# Patient Record
Sex: FEMALE | Race: OTHER | URBAN - METROPOLITAN AREA
[De-identification: names, ages, dates, MRNs, and addresses within clinical notes are randomized per-mention and may not be internally consistent; named-entity substitution may affect disease eponyms.]

---

## 2018-07-17 ENCOUNTER — OFFICE VISIT (OUTPATIENT)
Dept: FAMILY MEDICINE CLINIC | Age: 68
End: 2018-07-17

## 2018-07-17 ENCOUNTER — HOSPITAL ENCOUNTER (OUTPATIENT)
Dept: LAB | Age: 68
Discharge: HOME | End: 2018-07-17

## 2018-07-17 VITALS
HEIGHT: 57 IN | SYSTOLIC BLOOD PRESSURE: 168 MMHG | DIASTOLIC BLOOD PRESSURE: 84 MMHG | BODY MASS INDEX: 32.36 KG/M2 | TEMPERATURE: 98.3 F | HEART RATE: 68 BPM | WEIGHT: 150 LBS

## 2018-07-17 DIAGNOSIS — E03.9 ACQUIRED HYPOTHYROIDISM: ICD-10-CM

## 2018-07-17 DIAGNOSIS — K76.89 LIVER CYST: ICD-10-CM

## 2018-07-17 DIAGNOSIS — K59.04 CHRONIC IDIOPATHIC CONSTIPATION: ICD-10-CM

## 2018-07-17 DIAGNOSIS — R03.0 ELEVATED BLOOD PRESSURE READING WITHOUT DIAGNOSIS OF HYPERTENSION: ICD-10-CM

## 2018-07-17 DIAGNOSIS — E03.9 ACQUIRED HYPOTHYROIDISM: Primary | ICD-10-CM

## 2018-07-17 LAB
ALBUMIN SERPL-MCNC: 3.9 G/DL (ref 3.5–5)
ALBUMIN/GLOB SERPL: 1.1 {RATIO} (ref 1.1–2.2)
ALP SERPL-CCNC: 117 U/L (ref 45–117)
ALT SERPL-CCNC: 26 U/L (ref 12–78)
ANION GAP SERPL CALC-SCNC: 7 MMOL/L (ref 5–15)
AST SERPL-CCNC: 26 U/L (ref 15–37)
BILIRUB SERPL-MCNC: 0.3 MG/DL (ref 0.2–1)
BUN SERPL-MCNC: 11 MG/DL (ref 6–20)
BUN/CREAT SERPL: 14 (ref 12–20)
CALCIUM SERPL-MCNC: 9.3 MG/DL (ref 8.5–10.1)
CHLORIDE SERPL-SCNC: 104 MMOL/L (ref 97–108)
CO2 SERPL-SCNC: 30 MMOL/L (ref 21–32)
CREAT SERPL-MCNC: 0.77 MG/DL (ref 0.55–1.02)
EST. AVERAGE GLUCOSE BLD GHB EST-MCNC: 117 MG/DL
GLOBULIN SER CALC-MCNC: 3.6 G/DL (ref 2–4)
GLUCOSE SERPL-MCNC: 108 MG/DL (ref 65–100)
HBA1C MFR BLD: 5.7 % (ref 4.2–6.3)
POTASSIUM SERPL-SCNC: 4.3 MMOL/L (ref 3.5–5.1)
PROT SERPL-MCNC: 7.5 G/DL (ref 6.4–8.2)
SODIUM SERPL-SCNC: 141 MMOL/L (ref 136–145)
TSH SERPL DL<=0.05 MIU/L-ACNC: 1.33 UIU/ML (ref 0.36–3.74)

## 2018-07-17 PROCEDURE — 83036 HEMOGLOBIN GLYCOSYLATED A1C: CPT | Performed by: FAMILY MEDICINE

## 2018-07-17 PROCEDURE — 80053 COMPREHEN METABOLIC PANEL: CPT | Performed by: FAMILY MEDICINE

## 2018-07-17 PROCEDURE — 84443 ASSAY THYROID STIM HORMONE: CPT | Performed by: FAMILY MEDICINE

## 2018-07-17 RX ORDER — DOCUSATE SODIUM 100 MG/1
100 CAPSULE, LIQUID FILLED ORAL
Qty: 60 CAP | Refills: 2 | Status: SHIPPED | OUTPATIENT
Start: 2018-07-17

## 2018-07-17 NOTE — PATIENT INSTRUCTIONS
Estreñimiento: Instrucciones de cuidado - [ Constipation: Care Instructions ] Instrucciones de cuidado Tener estreñimiento significa que usted tiene dificultades para eliminar las heces (evacuaciones del intestino). Las personas eliminan heces entre 3 veces al día y [place] vez cada 3 días. Lo que es normal para usted puede ser [employer]. El estreñimiento puede ocurrir con dolor en el recto y cólicos. El dolor podría empeorar cuando trata de eliminar las heces. A veces hay pequeñas cantidades de paul gabino viva en el papel higiénico o en la superficie de las heces. [place] se debe a las venas dilatadas cerca del recto (hemorroides). Algunos cambios en alcaraz [place] y estilo de lencho podrían ayudarle a evitar el estreñimiento continuo. Es posible que el médico además le recete medicamentos para ayudar a aflojar las heces. Algunos medicamentos pueden causar estreñimiento. [place] incluye los analgésicos (medicamentos para el dolor) y los antidepresivos. Infórmele a alcaraz médico sobre Justine Larry que usted tomeka. Es posible que alcaraz médico quiera cambiar un medicamento para aliviar natty síntomas. La atención de seguimiento es cary parte clave de alcaraz tratamiento y seguridad. Asegúrese de hacer y acudir a todas las citas, y llame a alcaraz médico si está teniendo problemas. También es cary buena idea saber los resultados de natty exámenes y mantener cary lista de los medicamentos que tomeka. Cómo puede cuidarse en el hogar? · [place] abundantes líquidos, los suficientes suraj para que alcaraz orina sea de color amarillo sam o transparente suraj el agua. Si tiene [employer], del corazón o del hígado y tiene que Nelson's líquidos, hable con alcaraz médico antes de aumentar alcaraz consumo. · Incluya en alcaraz dieta diaria alimentos ricos en fibra. Estos incluyen frutas, verduras, frijoles (habichuelas) y granos integrales. · Melba por lo menos 30 minutos de ejercicio la mayoría de los días de la [place].  Caminar es [place] opción. Es posible que también quiera hacer otras actividades, suraj correr, nadar, [employer], o jugar al tenis u otros deportes de equipo. · [place] un suplemento de [place], suraj Cit[employer]cel o Metamucil, todos los GRASSE. Marie y siga todas las indicaciones de la [place]. · Programe tiempo todos los días para evacuar el intestino. Anurag bergeron podría ayudar. Tómese alcaraz tiempo para evacuar el intestino. · Apoye los pies sobre un banco o taburete pequeño cuando se siente en el inodoro. [place] ayuda a flexionar las caderas y coloca la pelvis en posición de cuclillas. · Alcaraz médico podría recomendarle un laxante de venta deborah para aliviar el estreñimiento. Nonda Rude son Ashleigh Bottcher de Magnesia (Milk of Magnesia) y [place]. Marie y siga todas las instrucciones de la [place]. No use laxantes de [employer]. Cuándo debe pedir ayuda? Llame a alcaraz médico ahora mismo o busque atención médica inmediata si: 
  · Tiene dolor abdominal nuevo o peor.  
  · Tiene náuseas o vómito nuevos o peores.  
  · Tiene paul en las heces.  
 Preste especial atención a los cambios en alcaraz thad y asegúrese de comunicarse con alcaraz médico si: 
  · Alcaraz estreñimiento empeora.  
  · No mejora suraj se esperaba. Dónde puede encontrar más información en inglés? Sandie Thelma a http://awilda-bj.info/. Escriba P343 en la búsqueda para aprender Bernard Lindo de \"Estreñimiento: Instrucciones de cuidado - [ Constipation: Care Instructions ]. \" 
Revisado: 20 noviembre, 2017 Versión del contenido: 11.7 © 9461-1865 Healthwise, Incorporated. Las instrucciones de cuidado fueron adaptadas bajo licencia por [facility] [employer]ctions (which disclaims liability or warranty for this information). Si usted tiene [place] afección médica o sobre estas instrucciones, siempre pregunte a alcaraz profesional de thad. Healthwise, Incorporated niega toda garantía o responsabilidad por alcaraz uso de esta información.  
 
  
Presión arterial elevada: Instrucciones de cuidado - [ Elevated Blood Pressure: Care Instructions ] Instrucciones de cuidado La presión arterial es cary medida de la fuerza que ejerce la paul contra las heart de las arterias. Es normal que la presión arterial suba y baje a lo enedelia del día. Deya si se mantiene lizbeth por un tiempo, usted tiene presión arterial lizbeth. Dos números indican alcaraz presión arterial. El primer número es la presión sistólica. Muestra qué tan alexandre presiona la paul cuando el corazón está bombeando. El milly número es la presión diastólica. Muestra qué tan alexandre presiona la [employer] latidos, cuando el corazón está relajado y llenándose de [place]. Lourena Alias arterial ideal para adultos es menos de 120/80 (diga \"120 sobre 80\"). La presión arterial lizbeth es de 140/90 o superior. Usted tiene la presión arterial lizbeth si el número de [place] es 140 o superior o el número de abajo es 90 o superior, o ambas cosas. La prueba principal para la presión arterial lizbeth es simple, rápida e indolora. Para diagnosticar presión arterial lizbeth, alcaraz médico examinará alcaraz presión arterial en momentos diferentes. Después de tomarle la presión, es posible que alcaraz médico le pida que se vuelva a cecilia la presión en casa. Si se le diagnostica presión arterial lizbeth, puede colaborar con alcaraz médico para elaborar un plan a enedelia plazo para manejarla. La atención de seguimiento es cary parte clave de alcaraz tratamiento y seguridad. Asegúrese de hacer y acudir a todas las citas, y llame a alcaraz médico si está teniendo problemas. También es cary buena idea saber los resultados de los exámenes y mantener cary lista de los medicamentos que tomeka. Cómo puede cuidarse en el hogar? · No fume. Fumar aumenta alcaraz riesgo de ataque cerebral y ataque al corazón. Si necesita ayuda para dejarlo, hable con alcaraz médico sobre programas y medicamentos para dejar de fumar. Estos pueden aumentar natty probabilidades de dejar de fumar para siempre.  
· Shashank Jacobo un peso saludable. · Trate de limitar la cantidad de sodio que ingiere a menos de 2,300 miligramos (mg) al día. Alcaraz médico podría pedirle que trate de ingerir menos de 1,500 mg al día. · Manténgase físicamente activo. Melba al menos 30 minutos de ejercicio la mayoría de los días de la [place]. Caminar es cary buena opción. Es posible que también quiera hacer otras actividades, suraj correr, nadar, [employer], o practicar tenis o deportes de equipo. · No tome alcohol o limite la cantidad que edouard. Hable con alcaraz médico acerca de si puede cecilia alcohol. · Coma abundantes frutas, verduras y productos lácteos bajos en grasa. Consuma menos grasa saturada y total. 
· Aprenda a revisarse la presión arterial en alcaraz hogar. Cuándo debe pedir ayuda? Llame a alcaraz médico ahora mismo o busque atención médica inmediata si: 
? · Alcaraz presión arterial es mucho más lizbeth de lo normal (suraj 180/110 o superior). ? · Alexa que la presión arterial lizbeth está causando síntomas suraj: ¨ Dolor de eyal intenso. Õpetajate 63. ? Vigile muy de cerca los cambios en alcaraz thad, y asegúrese de comunicarse con alcaraz médico si: 
? · No mejora suraj se esperaba. Dónde puede encontrar más información en inglés? Iggy Kilpatrick a http://awilda-bj.info/. Glorya Sacks P301 en la búsqueda para aprender más acerca de \"Presión arterial elevada: Instrucciones de cuidado - [ Elevated Blood Pressure: Care Instructions ]. \" 
Revisado: 21 septiembre, 2016 Versión del contenido: 11.4 © 6240-6929 Healthwise, Incorporated. Las instrucciones de cuidado fueron adaptadas bajo licencia por [facility] Connections (which disclaims liability or warranty for this information). Si usted tiene [place] afección médica o sobre estas instrucciones, siempre pregunte a alcaraz profesional de thad. [facility] niega toda garantía o responsabilidad por alcaraz uso de esta información.

## 2018-07-17 NOTE — PROGRESS NOTES
Noemi Carey is a 80 y.o. female    Issues discussed today include:    Chief Complaint   Patient presents with    Thyroid Problem    Medication Refill       1) Thyroid problem:  Dx'd with hypothyroidism in [place] ~ 2 yrs ago. Is taking levothyroxine 25mcg daily in the am. Had labs q6 months for this, will be due in August 2018. Started this 2 yrs ago and they doctor told her she would need it forever. At that time was having dry cough and lots, phlegm. Denies having fatigue, cold intolerance at that time. Then and now experiences constipation. 2) S/p cholecystectomy:  Had surgery 2 yrs ago. She recently had an US of her abd and they saw incidental \"simple cysts\" in the liver. Says she had the us bc during the gall bladder surgery, he saw the cysts and wanted to investigate more. Data reviewed or ordered today:       Other problems include: There is no problem list on file for this patient. Medications: Allergies:  No Known Allergies    LMP:  No LMP recorded. Patient is not currently having periods (Reason: Menopause). Social History     Social History    Marital status:      Spouse name: N/A    Number of children: N/A    Years of education: N/A     Occupational History    Not on file. Social History Main Topics    Smoking status: Never Smoker    Smokeless tobacco: Never Used    Alcohol use No    Drug use: No    Sexual activity: Not on file     Other Topics Concern    Not on file     Social History Narrative    No narrative on file       No family history on file.       Physical Exam   Visit Vitals    /84 (BP 1 Location: Left arm, BP Patient Position: Sitting)    Pulse 68    Temp 98.3 °F (36.8 °C) (Oral)    Ht 4' 8.69\" (1.44 m)    Wt 150 lb (68 kg)    BMI 32.81 kg/m2      BP Readings from Last 3 Encounters:   07/17/18 168/84     Constitutional: Appears well,  No acute distress, Vitals noted  Psychiatric:  Affect normal, Alert and Oriented to person/place/time  Eyes:  Conjunctiva clear, no drainage  ENT:  External ears and nose normal, Mucous membranes moist  Neck:  General inspection normal. Supple. Heart:  Normal HR, Normal S1 and S2,  Regular rhythm. No murmurs, rubs or gallops. Lungs:  Clear to auscultation, good respiratory effort, no wheezes, rales or rhonchi  Extremities: Without edema, good peripheral pulses  Skin:  Warm to palpation, without rashes      Assessment/Plan:      ICD-10-CM ICD-9-CM    1. Acquired hypothyroidism E03.9 244.9 TSH 3RD GENERATION   2. Liver cyst Q20.10 680.6 METABOLIC PANEL, COMPREHENSIVE   3. Chronic idiopathic constipation R90.73 400.07 METABOLIC PANEL, COMPREHENSIVE   4. Elevated blood pressure reading without diagnosis of hypertension R03.0 796.2    5. Body mass index (BMI) of 32.0 to 32.9 in adult Z68.32 V85.32 HEMOGLOBIN A1C WITH EAG       1) Hypothyroidism - chronic, followed in [place] and has meds  - TSH today  - F/u with MD in [place]    2) Chronic constipation - may be 2/2 #1 or idiopathic  - Rx for colace bid prn  - AVS handout on topic    3) Elevated BP - pt without known h/o HTN  - Check BP 1-2 times weekly and RTC if consistently > 150/90    4) Simple cysts in liver measuring 1.38 - 2.02 cm on US, no need for f/u imaging  - Will make copy of documents for pt's file    Labs today - will call if abnormal or she can call in a week to know results        Follow-up Disposition:  Return in about 3 months (around 10/17/2018).         Justice Danielson MD  [address] [place] [address]

## 2018-07-17 NOTE — PROGRESS NOTES
Patient seen for discharge with assistance from [employer]  # [id number]. We reviewed the AVS instructions and printed prescription for Colace. The patient stated she is leaving in August to return to her home in [place] for a year. She will follow-up with her doctor there. She will go now to the lab.  Erica Day RN

## 2018-07-18 NOTE — PROGRESS NOTES
TSH wnl  ** Continue current dose of levothyroxine pt is taking from [facility], she told me yesterday she had enough pills and didn't need rx    A1c 5.7% - barely in the pre-diabetic range  ** Work on diet and exercise, weight loss. Avoid concentrated sweets, no soda. Aim for 20 minutes of aerobic exercise daily or 150 minutes weekly.    ** Can get last q 6-12 months in [place] to be sure no progressing    BMP wnl, no kidney or electrolyte problems identified

## 2018-08-10 NOTE — PROGRESS NOTES
Telephoned patient to home/cell, no answer left generic message to return call to the [facility] main office. Tried calling emergency contact but the phone number is the same as the patient.  Summer Garcia RN

## 2018-08-14 ENCOUNTER — TELEPHONE (OUTPATIENT)
Dept: FAMILY MEDICINE CLINIC | Age: 68
End: 2018-08-14

## 2018-08-15 NOTE — TELEPHONE ENCOUNTER
ID # [id number]    Discussed lab results and Dr thompson recommendations with patient. Pt verbalized understanding and had no further questions.

## 2022-03-19 PROBLEM — K76.89 LIVER CYST: Status: ACTIVE | Noted: 2019-09-13

## 2022-03-19 PROBLEM — E03.9 ACQUIRED HYPOTHYROIDISM: Status: ACTIVE | Noted: 2019-09-13

## 2022-09-15 ENCOUNTER — OFFICE VISIT (OUTPATIENT)
Dept: FAMILY MEDICINE CLINIC | Age: 72
End: 2022-09-15

## 2022-09-15 VITALS
RESPIRATION RATE: 16 BRPM | SYSTOLIC BLOOD PRESSURE: 138 MMHG | OXYGEN SATURATION: 99 % | DIASTOLIC BLOOD PRESSURE: 80 MMHG | HEIGHT: 57 IN | TEMPERATURE: 97.9 F | BODY MASS INDEX: 35.03 KG/M2 | HEART RATE: 73 BPM | WEIGHT: 162.4 LBS

## 2022-09-15 DIAGNOSIS — E66.01 SEVERE OBESITY (BMI 35.0-39.9) WITH COMORBIDITY (HCC): ICD-10-CM

## 2022-09-15 DIAGNOSIS — R05.9 COUGH: Primary | ICD-10-CM

## 2022-09-15 DIAGNOSIS — E78.5 HYPERLIPIDEMIA, UNSPECIFIED HYPERLIPIDEMIA TYPE: ICD-10-CM

## 2022-09-15 DIAGNOSIS — E03.9 ACQUIRED HYPOTHYROIDISM: ICD-10-CM

## 2022-09-15 PROCEDURE — 99203 OFFICE O/P NEW LOW 30 MIN: CPT | Performed by: NURSE PRACTITIONER

## 2022-09-15 PROCEDURE — 1123F ACP DISCUSS/DSCN MKR DOCD: CPT | Performed by: NURSE PRACTITIONER

## 2022-09-15 RX ORDER — LORATADINE 10 MG/1
10 TABLET ORAL DAILY
Qty: 30 TABLET | Refills: 3 | Status: SHIPPED | OUTPATIENT
Start: 2022-09-15

## 2022-09-15 RX ORDER — LEVOTHYROXINE SODIUM 25 UG/1
25 TABLET ORAL
COMMUNITY

## 2022-09-15 RX ORDER — ROSUVASTATIN CALCIUM 20 MG/1
20 TABLET, COATED ORAL
COMMUNITY

## 2022-09-15 NOTE — PROGRESS NOTES
9/15/2022 : Mariana Lina Tamayo (: 1950) is a 71 y.o. female,  established patient, here for evaluation of the following chief complaint(s):  Thyroid Problem (Pt c/o \"swollen gland in neck that leads directly to heart\"; causes neck pain and itchiness) and Annual Wellness Visit (Hasn't been seen in <1 year)     ASSESSMENT/PLAN:  Below is the assessment and plan developed based on review of pertinent history, physical exam, labs, studies, and medications. 1. Cough  -     loratadine (CLARITIN) 10 mg tablet; Take 1 Tablet by mouth daily. For allergies; Margi 1 tab diario para alergias, Normal, Disp-30 Tablet, R-3  2. Acquired hypothyroidism  -     TSH 3RD GENERATION; Future  -     HEMOGLOBIN A1C WITH EAG; Future  -     METABOLIC PANEL, COMPREHENSIVE; Future  3. Hyperlipidemia, unspecified hyperlipidemia type  4. Severe obesity (BMI 35.0-39. 9) with comorbidity [facility])    She goes back to her country in [place]. SUBJECTIVE/OBJECTIVE:  HPI History of high cholesterol that \"is stuck inside of my gland due to the problems I have with my thyroid\". Her throat itches and has cough. In the day and night. Dry cough. Since 8 years ago. Here with Nadia Oz. Here since . Has gotten worse since being in [place]. No itching in the throat or eyes. No results found for any visits on 09/15/22. Review of Systems: Negative for: fever, chest pain, shortness of breath, leg swelling. Social History:  reports that she has never smoked. She has never used smokeless tobacco. She reports that she does not drink alcohol and does not use drugs. Current Medications:   Current Outpatient Medications   Medication Sig    valsartan 320 mg tab 320 mg, hydroCHLOROthiazide 12.5 mg cap 12.5 mg Take 1 Dose by mouth daily. levothyroxine (SYNTHROID) 25 mcg tablet Take 25 mcg by mouth Daily (before breakfast). rosuvastatin (CRESTOR) 20 mg tablet Take 20 mg by mouth nightly.     loratadine (CLARITIN) 10 mg tablet Take 1 Tablet by mouth daily. For allergies; Margi 1 tab diario para alergias    docusate sodium (COLACE) 100 mg capsule Take 1 Cap by mouth two (2) times daily as needed for Constipation. [place] 1 capsula dos veces al sindhu si necesita para estrinimiento (Patient not taking: Reported on 9/15/2022)   117/67 with pulse 81. Physical Examination: No LMP recorded. (Menstrual status: Menopause). Blood pressure 138/80, pulse 73, temperature 97.9 °F (36.6 °C), temperature source Temporal, resp. rate 16, height 4' 8.69\" (1.44 m), weight 162 lb 6.4 oz (73.7 kg), SpO2 99 %. General appearance - well developed, no acute distress. Chest - clear to auscultation. Heart - regular rate and rhythm without murmurs, rubs, or gallops. Abdomen - bowel sounds present x 4, NT, ND  Extremities - no CCE. An electronic signature was used to authenticate this note.   -- Jad Santana NP

## 2022-09-15 NOTE — PROGRESS NOTES
Chief Complaint   Patient presents with    Thyroid Problem     Pt c/o \"swollen gland in neck that leads directly to heart\"; causes neck pain and itchiness    Annual Wellness Visit     Hasn't been seen in <1 year     Visit Vitals  BP (!) 170/74 (BP 1 Location: Right upper arm, BP Patient Position: Sitting)   Pulse 73   Temp 97.9 °F (36.6 °C) (Temporal)   Resp 16   Ht 4' 8.69\" (1.44 m)   Wt 162 lb 6.4 oz (73.7 kg)   SpO2 99%   BMI 35.52 kg/m²     Coordination of Care  1. Have you been to the ER, urgent care clinic since your last visit? Hospitalized since your last visit? Yes [facility] ~ 2 months ago for Bronchitis    2. Have you seen or consulted any other health care providers outside of the [address] [place] [address] since your last visit? Include any pap smears or colon screening. No    Does the patient need refills? N/A    Learning Assessment Complete?  yes  Depression Screening complete in the past 12 months? yes

## 2022-09-15 NOTE — PROGRESS NOTES
AVS printed and with the assistance of Georgian interpretor, Haseeb Heath, reviewed with patient. Rodger coupon for Loratadine at [facility] and given to patient. Patient indicated understanding and appreciated the service today.

## 2022-09-16 ENCOUNTER — HOSPITAL ENCOUNTER (OUTPATIENT)
Dept: LAB | Age: 72
Discharge: HOME | End: 2022-09-16

## 2022-09-16 DIAGNOSIS — E03.9 ACQUIRED HYPOTHYROIDISM: ICD-10-CM

## 2022-09-16 LAB
ALBUMIN SERPL-MCNC: 3.7 G/DL (ref 3.5–5)
ALBUMIN/GLOB SERPL: 1.2 {RATIO} (ref 1.1–2.2)
ALP SERPL-CCNC: 84 U/L (ref 45–117)
ALT SERPL-CCNC: 25 U/L (ref 12–78)
ANION GAP SERPL CALC-SCNC: 6 MMOL/L (ref 5–15)
AST SERPL-CCNC: 19 U/L (ref 15–37)
BILIRUB SERPL-MCNC: 0.3 MG/DL (ref 0.2–1)
BUN SERPL-MCNC: 14 MG/DL (ref 6–20)
BUN/CREAT SERPL: 19 (ref 12–20)
CALCIUM SERPL-MCNC: 9.3 MG/DL (ref 8.5–10.1)
CHLORIDE SERPL-SCNC: 106 MMOL/L (ref 97–108)
CO2 SERPL-SCNC: 29 MMOL/L (ref 21–32)
CREAT SERPL-MCNC: 0.72 MG/DL (ref 0.55–1.02)
EST. AVERAGE GLUCOSE BLD GHB EST-MCNC: 126 MG/DL
GLOBULIN SER CALC-MCNC: 3.1 G/DL (ref 2–4)
GLUCOSE SERPL-MCNC: 93 MG/DL (ref 65–100)
HBA1C MFR BLD: 6 % (ref 4–5.6)
POTASSIUM SERPL-SCNC: 3.9 MMOL/L (ref 3.5–5.1)
PROT SERPL-MCNC: 6.8 G/DL (ref 6.4–8.2)
SODIUM SERPL-SCNC: 141 MMOL/L (ref 136–145)
TSH SERPL DL<=0.05 MIU/L-ACNC: 1.86 UIU/ML (ref 0.36–3.74)

## 2022-09-16 PROCEDURE — 83036 HEMOGLOBIN GLYCOSYLATED A1C: CPT

## 2022-09-16 PROCEDURE — 80053 COMPREHEN METABOLIC PANEL: CPT

## 2022-09-16 PROCEDURE — 84443 ASSAY THYROID STIM HORMONE: CPT

## 2022-09-21 NOTE — PROGRESS NOTES
A normal lab letter was created and sent to queue, to be printed and mailed to the pt when a printer is available.  Antonio Montaño RN

## 2023-05-22 RX ORDER — LEVOTHYROXINE SODIUM 0.03 MG/1
25 TABLET ORAL
COMMUNITY

## 2023-05-22 RX ORDER — ROSUVASTATIN CALCIUM 20 MG/1
20 TABLET, COATED ORAL NIGHTLY
COMMUNITY

## 2023-05-22 RX ORDER — PSEUDOEPHEDRINE HCL 30 MG
100 TABLET ORAL 2 TIMES DAILY PRN
COMMUNITY
Start: 2018-07-17

## 2023-05-22 RX ORDER — LORATADINE 10 MG/1
10 TABLET ORAL DAILY
COMMUNITY
Start: 2022-09-15

## 2024-02-16 ENCOUNTER — HOSPITAL ENCOUNTER (EMERGENCY)
Facility: HOSPITAL | Age: 74
Discharge: HOME | End: 2024-02-16
Attending: EMERGENCY MEDICINE

## 2024-02-16 VITALS
HEART RATE: 78 BPM | HEIGHT: 57 IN | OXYGEN SATURATION: 100 % | TEMPERATURE: 97.4 F | BODY MASS INDEX: 33.53 KG/M2 | RESPIRATION RATE: 16 BRPM | DIASTOLIC BLOOD PRESSURE: 63 MMHG | SYSTOLIC BLOOD PRESSURE: 127 MMHG | WEIGHT: 155.42 LBS

## 2024-02-16 DIAGNOSIS — M79.604 RIGHT LEG PAIN: Primary | ICD-10-CM

## 2024-02-16 PROCEDURE — 99282 EMERGENCY DEPT VISIT SF MDM: CPT

## 2024-02-16 NOTE — ED PROVIDER NOTES
[facility] EMERGENCY DEPT  EMERGENCY DEPARTMENT ENCOUNTER      Pt Name: Siobhan Rider  MRN: [id number]  Birthdate 1950  Date of evaluation: 2/16/2024  Provider: Kye Johnson MD      HISTORY OF PRESENT ILLNESS      73-year-old  female presenting to the ER for multiple complaints.  Patient reports she has a history of uterine cancer, MI.  Reports that [place] just arrived [place] the [place] yesterday from [place].  She is looking to establish primary care.  States that she is on multiple medications but does not know any of their names at this point.  Reports that she has some chronic abdominal discomfort and chronic right lower extremity discomfort.  Her pain is currently well-controlled.  Her goal is to establish primary care.              Nursing Notes were reviewed.    REVIEW OF SYSTEMS         Review of Systems   Constitutional:  Negative for chills and fever.   HENT:  Negative for congestion and sore throat.    Eyes:  Negative for pain and visual disturbance.   Respiratory:  Negative for chest tightness and shortness of breath.    Cardiovascular:  Negative for chest pain and leg swelling.   Gastrointestinal:  Positive for abdominal pain. Negative for vomiting.   Genitourinary:  Negative for dysuria.   Musculoskeletal:  Positive for myalgias. Negative for back pain.   Skin:  Negative for rash.   Neurological:  Negative for weakness and headaches.   All other systems reviewed and are negative.          PAST MEDICAL HISTORY   No past medical history on file.      SURGICAL HISTORY     No past surgical history on file.      CURRENT MEDICATIONS       Previous Medications    DOCUSATE (COLACE, DULCOLAX) 100 MG CAPS    Take 100 mg by mouth 2 times daily as needed    LEVOTHYROXINE (SYNTHROID) 25 MCG TABLET    Take 1 tablet by mouth every morning (before breakfast)    LORATADINE (CLARITIN) 10 MG TABLET    Take 1 tablet by mouth daily    ROSUVASTATIN (CRESTOR) 20 MG TABLET    Take 1 tablet by mouth nightly

## 2024-02-16 NOTE — ED TRIAGE NOTES
Patient arrives to the ED via POV with complaints of bilateral leg spasms that occurs at intermittently night.     Patient reports no pain at this time. Patient reports pain is relieved upon standing.    PMH of HTN

## 2024-03-19 ENCOUNTER — OFFICE VISIT (OUTPATIENT)
Age: 74
End: 2024-03-19

## 2024-03-19 VITALS
HEART RATE: 83 BPM | DIASTOLIC BLOOD PRESSURE: 64 MMHG | HEIGHT: 57 IN | WEIGHT: 151.5 LBS | SYSTOLIC BLOOD PRESSURE: 138 MMHG | BODY MASS INDEX: 32.68 KG/M2 | TEMPERATURE: 97.3 F | OXYGEN SATURATION: 98 %

## 2024-03-19 DIAGNOSIS — I10 HYPERTENSION, UNSPECIFIED TYPE: ICD-10-CM

## 2024-03-19 DIAGNOSIS — K29.60 OTHER GASTRITIS WITHOUT HEMORRHAGE, UNSPECIFIED CHRONICITY: ICD-10-CM

## 2024-03-19 DIAGNOSIS — E78.00 PURE HYPERCHOLESTEROLEMIA: ICD-10-CM

## 2024-03-19 DIAGNOSIS — E03.9 HYPOTHYROIDISM, UNSPECIFIED TYPE: ICD-10-CM

## 2024-03-19 DIAGNOSIS — I20.9 ANGINA PECTORIS (HCC): Primary | ICD-10-CM

## 2024-03-19 PROCEDURE — 99203 OFFICE O/P NEW LOW 30 MIN: CPT | Performed by: NURSE PRACTITIONER

## 2024-03-19 PROCEDURE — 3078F DIAST BP <80 MM HG: CPT | Performed by: NURSE PRACTITIONER

## 2024-03-19 PROCEDURE — 3075F SYST BP GE 130 - 139MM HG: CPT | Performed by: NURSE PRACTITIONER

## 2024-03-19 PROCEDURE — 1123F ACP DISCUSS/DSCN MKR DOCD: CPT | Performed by: NURSE PRACTITIONER

## 2024-03-19 RX ORDER — NITROGLYCERIN 0.4 MG/1
0.4 TABLET SUBLINGUAL EVERY 5 MIN PRN
Qty: 6 TABLET | Refills: 0 | Status: SHIPPED | OUTPATIENT
Start: 2024-03-19 | End: 2024-03-19

## 2024-03-19 RX ORDER — ASPIRIN 81 MG/1
81 TABLET ORAL DAILY
Qty: 90 TABLET | Refills: 0 | Status: SHIPPED | OUTPATIENT
Start: 2024-03-19

## 2024-03-19 RX ORDER — ESOMEPRAZOLE MAGNESIUM 40 MG/1
40 CAPSULE, DELAYED RELEASE ORAL
Qty: 30 CAPSULE | Refills: 1 | Status: SHIPPED | OUTPATIENT
Start: 2024-03-19

## 2024-03-19 RX ORDER — VALSARTAN AND HYDROCHLOROTHIAZIDE 320; 12.5 MG/1; MG/1
1 TABLET, FILM COATED ORAL DAILY
Qty: 30 TABLET | Refills: 5 | Status: SHIPPED | OUTPATIENT
Start: 2024-03-19

## 2024-03-19 RX ORDER — ATORVASTATIN CALCIUM 40 MG/1
40 TABLET, FILM COATED ORAL DAILY
Qty: 30 TABLET | Refills: 3 | Status: SHIPPED | OUTPATIENT
Start: 2024-03-19

## 2024-03-19 RX ORDER — NITROGLYCERIN 0.4 MG/1
0.4 TABLET SUBLINGUAL DAILY
Qty: 6 TABLET | Refills: 0 | Status: SHIPPED | OUTPATIENT
Start: 2024-03-19 | End: 2024-03-19

## 2024-03-19 RX ORDER — VALSARTAN AND HYDROCHLOROTHIAZIDE 320; 12.5 MG/1; MG/1
1 TABLET, FILM COATED ORAL DAILY
Qty: 30 TABLET | Refills: 5 | Status: SHIPPED | OUTPATIENT
Start: 2024-03-19 | End: 2024-03-19

## 2024-03-19 RX ORDER — ATORVASTATIN CALCIUM 40 MG/1
40 TABLET, FILM COATED ORAL DAILY
Qty: 30 TABLET | Refills: 3 | Status: SHIPPED | OUTPATIENT
Start: 2024-03-19 | End: 2024-03-19

## 2024-03-19 RX ORDER — ISOSORBIDE MONONITRATE 20 MG/1
10 TABLET ORAL EVERY 12 HOURS
Qty: 60 TABLET | Refills: 3 | Status: SHIPPED | OUTPATIENT
Start: 2024-03-19

## 2024-03-19 RX ORDER — NITROGLYCERIN 0.4 MG/1
0.4 TABLET SUBLINGUAL DAILY
Qty: 6 TABLET | Refills: 0 | Status: SHIPPED | OUTPATIENT
Start: 2024-03-19 | End: 2024-03-20

## 2024-03-19 RX ORDER — ASPIRIN 81 MG/1
81 TABLET ORAL DAILY
Qty: 90 TABLET | Refills: 0 | Status: SHIPPED | OUTPATIENT
Start: 2024-03-19 | End: 2024-03-19

## 2024-03-19 RX ORDER — CLOPIDOGREL BISULFATE 75 MG/1
75 TABLET ORAL DAILY
Qty: 30 TABLET | Refills: 3 | Status: SHIPPED | OUTPATIENT
Start: 2024-03-19

## 2024-03-19 RX ORDER — ISOSORBIDE MONONITRATE 20 MG/1
10 TABLET ORAL EVERY 12 HOURS
Qty: 60 TABLET | Refills: 3 | Status: SHIPPED | OUTPATIENT
Start: 2024-03-19 | End: 2024-03-19

## 2024-03-19 RX ORDER — LEVOTHYROXINE SODIUM 0.03 MG/1
25 TABLET ORAL DAILY
Qty: 90 TABLET | Refills: 1 | Status: SHIPPED | OUTPATIENT
Start: 2024-03-19

## 2024-03-19 RX ORDER — CLOPIDOGREL BISULFATE 75 MG/1
75 TABLET ORAL DAILY
Qty: 30 TABLET | Refills: 3 | Status: SHIPPED | OUTPATIENT
Start: 2024-03-19 | End: 2024-03-19

## 2024-03-19 RX ORDER — ESOMEPRAZOLE MAGNESIUM 40 MG/1
40 CAPSULE, DELAYED RELEASE ORAL
Qty: 30 CAPSULE | Refills: 1 | Status: SHIPPED | OUTPATIENT
Start: 2024-03-19 | End: 2024-03-19

## 2024-03-19 RX ORDER — LEVOTHYROXINE SODIUM 0.03 MG/1
25 TABLET ORAL DAILY
Qty: 90 TABLET | Refills: 1 | Status: SHIPPED | OUTPATIENT
Start: 2024-03-19 | End: 2024-03-19

## 2024-03-19 ASSESSMENT — PATIENT HEALTH QUESTIONNAIRE - PHQ9
SUM OF ALL RESPONSES TO PHQ QUESTIONS 1-9: 0
SUM OF ALL RESPONSES TO PHQ9 QUESTIONS 1 & 2: 0
SUM OF ALL RESPONSES TO PHQ QUESTIONS 1-9: 0
SUM OF ALL RESPONSES TO PHQ QUESTIONS 1-9: 0
2. FEELING DOWN, DEPRESSED OR HOPELESS: NOT AT ALL
1. LITTLE INTEREST OR PLEASURE IN DOING THINGS: NOT AT ALL
SUM OF ALL RESPONSES TO PHQ QUESTIONS 1-9: 0

## 2024-03-19 NOTE — PROGRESS NOTES
Chief Complaint   Patient presents with    Medication Refill     Levothyroxine 25mcg, Vastarel 35mg, crestor 20mg, valsartan/HCTZ 320/12.5, clopidrogel 75mg, esomeprazol 40mg, cardiosorbide M 20mg, aspirin 81 mg      Establish Care     Pt is usually seen in [place] by PCP, cardiologist, endocrinologist and gyn specialists. Hx of thyroid problems (currently taking Levothyroxine) Has been diagnosed with cyst in right thyroid lobe.  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    She was last seen by us about 5 years ago.     /64 (Site: Left Upper Arm, Position: Sitting, Cuff Size: Medium Adult)   Pulse 83   Temp 97.3 °F (36.3 °C) (Temporal)   Ht 1.45 m (4' 9.09\")   Wt 68.7 kg (151 lb 8 oz)   SpO2 98%   BMI 32.68 kg/m²   \"Have you been to the ER, urgent care clinic since your last visit?  Hospitalized since your last visit?\"    Yes- [facility] due to leg pain.    “Have you seen or consulted any other health care providers outside of [facility] since your last visit?”    NO    Have you had a mammogram?”   Yes- 12/2023 in [place]- Normal results  Date of last Mammogram: 9/20/2019         “Have you had a colorectal cancer screening such as a colonoscopy/FIT/Cologuard?    Yes- About 1 year ago    No colonoscopy on file  No cologuard on file  Date of last FIT: 9/20/2019   No flexible sigmoidoscopy on file         Click Here for Release of Records Request

## 2024-03-19 NOTE — PROGRESS NOTES
3/19/2024 : Sandy OMALLEY Serge Rider (: 1950) is a 73 y.o. female,  established patient, here for evaluation of the following chief complaint(s):  Medication Refill (Levothyroxine 25mcg, Vastarel 35mg, crestor 20mg, valsartan/HCTZ 320/12.5, clopidrogel 75mg, esomeprazol 40mg, cardiosorbide M 20mg, aspirin 81 mg/) and Establish Care (Pt is usually seen in [place] by PCP, cardiologist, endocrinologist and gyn specialists. 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 Gandhi and plan follow up once her medical records have been copied and scanned into the chart.  I note ultrasounds have been ordered for head/neck/soft tissue and transvaginal for 3/25/24 and has follow up appointment with OB/GYN on 3/26/24 with Dr. Aaron Goldberg.  Below is the assessment and plan developed based on review of pertinent history, physical exam, labs, studies, and medications.  1. Angina pectoris (HCC)  -     aspirin 81 MG EC tablet; Take 1 tablet by mouth daily For your heart.  [place] 1 tab diario para veras sonia., Disp-90 tablet, R-0Normal  -     clopidogrel (PLAVIX) 75 MG tablet; Take 1 tablet by mouth daily For your heart.  [place] 1 tab cada madhav para veras sonia., Disp-30 tablet, R-3Normal  -     isosorbide mononitrate (ISMO;MONOKET) 20 MG tablet; Take 0.5 tablets by mouth in the morning and 0.5 tablets in the evening. For your heart.  [place] la mitad por boca cada 12 horas para veras sonia.., Disp-60 tablet, R-3Normal  -     nitroGLYCERIN (NITROSTAT) 0.4 MG SL tablet; Place 1 tablet under the tongue as needed for Chest

## 2024-03-19 NOTE — PROGRESS NOTES
Patient discharged with AVS. Patient's name and  verified. Patient made aware of the prescriptions sent to the pharmacy. Medication review completed. Coupons given for the pharmacy. A copy of the medical records the patient brought in from [facility] was created for the chart. The patient was given a Prescription Assistance Program application and asked to complete it and bring it back to the clinic. Patient instructed to schedule an appointment to return in 1 month. Patient given an opportunity to voice questions/concerns. All questions addressed. [employer]  #[id number] assisted.

## 2024-03-20 RX ORDER — NITROGLYCERIN 0.4 MG/1
0.4 TABLET SUBLINGUAL PRN
Qty: 6 TABLET | Refills: 0 | Status: SHIPPED | OUTPATIENT
Start: 2024-03-20

## 2024-03-24 DIAGNOSIS — N85.00 ENDOMETRIAL HYPERPLASIA, UNSPECIFIED: Primary | ICD-10-CM

## 2024-03-25 DIAGNOSIS — Z87.898 HISTORY OF CHEST PAIN: Primary | ICD-10-CM

## 2024-03-25 NOTE — PROGRESS NOTES
3/25/2024  Diagnoses and all orders for this visit:    History of chest pain  -     Amb External Referral To Cardiology      After consultation with Dr. Gandhi, will refer to cardiology.  KY Hopkins - NP

## 2024-03-26 ENCOUNTER — HOSPITAL ENCOUNTER (OUTPATIENT)
Facility: HOSPITAL | Age: 74
Discharge: HOME | End: 2024-03-29

## 2024-03-26 DIAGNOSIS — N84.0 ENDOMETRIAL POLYP: ICD-10-CM

## 2024-03-26 DIAGNOSIS — R93.89 THICKENED ENDOMETRIUM: ICD-10-CM

## 2024-03-26 DIAGNOSIS — E04.1 THYROID NODULE: ICD-10-CM

## 2024-03-26 PROCEDURE — 76536 US EXAM OF HEAD AND NECK: CPT

## 2024-03-26 PROCEDURE — 76830 TRANSVAGINAL US NON-OB: CPT

## 2024-04-02 ENCOUNTER — OFFICE VISIT (OUTPATIENT)
Age: 74
End: 2024-04-02

## 2024-04-02 DIAGNOSIS — Z71.89 COUNSELING AND COORDINATION OF CARE: Primary | ICD-10-CM

## 2024-04-02 SDOH — ECONOMIC STABILITY: FOOD INSECURITY: WITHIN THE PAST 12 MONTHS, THE FOOD YOU BOUGHT JUST DIDN'T LAST AND YOU DIDN'T HAVE MONEY TO GET MORE.: NEVER TRUE

## 2024-04-02 SDOH — ECONOMIC STABILITY: INCOME INSECURITY: IN THE LAST 12 MONTHS, WAS THERE A TIME WHEN YOU WERE NOT ABLE TO PAY THE MORTGAGE OR RENT ON TIME?: NO

## 2024-04-02 SDOH — ECONOMIC STABILITY: TRANSPORTATION INSECURITY
IN THE PAST 12 MONTHS, HAS THE LACK OF TRANSPORTATION KEPT YOU FROM MEDICAL APPOINTMENTS OR FROM GETTING MEDICATIONS?: NO

## 2024-04-02 SDOH — ECONOMIC STABILITY: HOUSING INSECURITY
IN THE LAST 12 MONTHS, WAS THERE A TIME WHEN YOU DID NOT HAVE A STEADY PLACE TO SLEEP OR SLEPT IN A SHELTER (INCLUDING NOW)?: NO

## 2024-04-02 SDOH — ECONOMIC STABILITY: TRANSPORTATION INSECURITY
IN THE PAST 12 MONTHS, HAS LACK OF TRANSPORTATION KEPT YOU FROM MEETINGS, WORK, OR FROM GETTING THINGS NEEDED FOR DAILY LIVING?: NO

## 2024-04-02 SDOH — ECONOMIC STABILITY: FOOD INSECURITY: WITHIN THE PAST 12 MONTHS, YOU WORRIED THAT YOUR FOOD WOULD RUN OUT BEFORE YOU GOT MONEY TO BUY MORE.: NEVER TRUE

## 2024-04-02 ASSESSMENT — SOCIAL DETERMINANTS OF HEALTH (SDOH): HOW HARD IS IT FOR YOU TO PAY FOR THE VERY BASICS LIKE FOOD, HOUSING, MEDICAL CARE, AND HEATING?: NOT VERY HARD

## 2024-04-02 NOTE — PROGRESS NOTES
AN financial screening is complete. Patient has been instructed to call AN appointment line on or after 4/16/24.  
n/a

## 2024-04-19 ENCOUNTER — OFFICE VISIT (OUTPATIENT)
Age: 74
End: 2024-04-19

## 2024-04-19 VITALS
SYSTOLIC BLOOD PRESSURE: 150 MMHG | WEIGHT: 151 LBS | HEART RATE: 90 BPM | TEMPERATURE: 98.1 F | BODY MASS INDEX: 32.58 KG/M2 | OXYGEN SATURATION: 99 % | DIASTOLIC BLOOD PRESSURE: 85 MMHG

## 2024-04-19 DIAGNOSIS — Z71.89 COUNSELING AND COORDINATION OF CARE: Primary | ICD-10-CM

## 2024-04-19 DIAGNOSIS — N84.0 ENDOMETRIAL POLYP: Primary | ICD-10-CM

## 2024-04-19 DIAGNOSIS — E78.00 PURE HYPERCHOLESTEROLEMIA: ICD-10-CM

## 2024-04-19 DIAGNOSIS — I10 PRIMARY HYPERTENSION: ICD-10-CM

## 2024-04-19 PROCEDURE — 1123F ACP DISCUSS/DSCN MKR DOCD: CPT | Performed by: FAMILY MEDICINE

## 2024-04-19 PROCEDURE — 3077F SYST BP >= 140 MM HG: CPT | Performed by: FAMILY MEDICINE

## 2024-04-19 PROCEDURE — 3078F DIAST BP <80 MM HG: CPT | Performed by: FAMILY MEDICINE

## 2024-04-19 PROCEDURE — 99215 OFFICE O/P EST HI 40 MIN: CPT | Performed by: FAMILY MEDICINE

## 2024-04-19 RX ORDER — PSEUDOEPHEDRINE HCL 30 MG
100 TABLET ORAL
Qty: 90 CAPSULE | Refills: 1 | Status: SHIPPED | OUTPATIENT
Start: 2024-04-19

## 2024-04-19 ASSESSMENT — ENCOUNTER SYMPTOMS
SHORTNESS OF BREATH: 0
COUGH: 0
WHEEZING: 0

## 2024-04-19 NOTE — PROGRESS NOTES
Siobhan Rider seen at discharge. Full name and  verified; After visit Summary was given. RN reviewed today's visit with patient, as well as instructions on when it is recommended to return for follow-up visit in 3 months. RN reviewed the provider's instructions with the patient, answering all questions to her satisfaction. Patient verbalized understanding. Patient was scheduled with Dr. Guerrero for her referral to GYN ONC for  at 11 AM. Patient instructed to arrive at  least 15 minutes early.   KATHRIN ROWE RN

## 2024-04-19 NOTE — PROGRESS NOTES
Siobhan Rider (: 1950) is a 74 y.o. female, Established patient, here for evaluation of the following chief complaint(s):  Abdominal Pain (Follow up)       ASSESSMENT/PLAN:  1. Endometrial polyp  Main concern by pt and daughter are the follow up needed for her endometrial finding.  Recent US shows large polyp and so will refer to Gyn.  -     [facility], [place] ([address])  2. Primary hypertension  Mildly elevated today but it worsened as the visit progressed suggesting anxiety related.  Check home BP.  3. Pure hypercholesterolemia  There was some confusion on if she should be taking Lipitor or Crestor (she was taking both).  Recommend she continue with Lipitor at current dose.    Return for follow up F2F in 3 months for check up.    SUBJECTIVE:  Follow up for chronic medical problems.  Pt presents with daughter.  Arrived from [place] 2/15/2024.  Pt with a hx of HTN, HLD, CAD?, endometrial cystic mass, hypothyroidism.  Reviewed medical results from [place] 2023-2024.  Endometrial Mass:  found in [place] and was in PreOp 1/15/2024 when she developed chest pain and high blood pressure and so was taken to ED.  ECHO and serial EKGs available showed no acute changes.  She did have a Troponin 1.09 (elevated) and NL CHELSEY-MD.  She saw Cardiology 2 weeks later and was told it was likely not a heart attack.  No cath.  Was given Plavix, Isosorbide Mononitrate to take \"for prevention\" x 6 months.  Patient denies any chest pain, TREJO.  Is able to go up 2-3 flights of stairs without any chest pain or pressure.  HTN:  Patient is taking DiovanHCT regularly (chronic)  HLD:  Patient is taking Lipitor 40mg regularly (chronic).  Previously was taking Crestor 20 mg  PreDiabetes:  A1c 6.3     Shx: no smoking, no alcohol  PMHx:  Cholecystectomy (6 yrs ago)  BTL    Review of Systems   Constitutional:  Negative for chills, diaphoresis, fatigue and fever.   Respiratory:  Negative for

## 2024-04-19 NOTE — PROGRESS NOTES
Patient and her child, Benita Falk (listed on patient's PHI), came with concerns of medical bills patient has received via mail.   Patient had already started a [facility] FA application and FA is requesting Support Letter.   W notarized Support letter for patient and submitted it to FA. Patient should receive FA response via mail within the next two weeks.   CHW provided contact information, for future reference.

## 2024-05-06 ENCOUNTER — TELEPHONE (OUTPATIENT)
Age: 74
End: 2024-05-06

## 2024-05-06 NOTE — TELEPHONE ENCOUNTER
I called with Zohra,  and spoke with legal guardian, needed to r/s appt , legal guardian Benita Falk states pt has already established care with another physician and this appt needs to be canceled.  Will cancel

## 2024-05-15 ENCOUNTER — HOSPITAL ENCOUNTER (OUTPATIENT)
Facility: HOSPITAL | Age: 74
Discharge: HOME | End: 2024-05-18

## 2024-05-15 VITALS
HEIGHT: 56 IN | SYSTOLIC BLOOD PRESSURE: 160 MMHG | HEART RATE: 72 BPM | OXYGEN SATURATION: 98 % | RESPIRATION RATE: 18 BRPM | DIASTOLIC BLOOD PRESSURE: 74 MMHG | WEIGHT: 153 LBS | TEMPERATURE: 97.3 F | BODY MASS INDEX: 34.42 KG/M2

## 2024-05-15 LAB
ANION GAP SERPL CALC-SCNC: 5 MMOL/L (ref 5–15)
BUN SERPL-MCNC: 14 MG/DL (ref 6–20)
BUN/CREAT SERPL: 19 (ref 12–20)
CALCIUM SERPL-MCNC: 9.6 MG/DL (ref 8.5–10.1)
CHLORIDE SERPL-SCNC: 105 MMOL/L (ref 97–108)
CO2 SERPL-SCNC: 28 MMOL/L (ref 21–32)
CREAT SERPL-MCNC: 0.72 MG/DL (ref 0.55–1.02)
GLUCOSE SERPL-MCNC: 90 MG/DL (ref 65–100)
POTASSIUM SERPL-SCNC: 3.8 MMOL/L (ref 3.5–5.1)
SODIUM SERPL-SCNC: 138 MMOL/L (ref 136–145)

## 2024-05-15 PROCEDURE — 93005 ELECTROCARDIOGRAM TRACING: CPT | Performed by: OBSTETRICS & GYNECOLOGY

## 2024-05-15 PROCEDURE — 36415 COLL VENOUS BLD VENIPUNCTURE: CPT

## 2024-05-15 PROCEDURE — 80048 BASIC METABOLIC PNL TOTAL CA: CPT

## 2024-05-15 NOTE — DISCHARGE INSTRUCTIONS
[facility] [place] [facility] [place]   [address] [place] [address]   PRE-ADMISSION TESTING [phone]      Fecha de la Cirugía:  Thursday 5/23/2024     El personal de [place] le llamará entre las 3 y las 7pm, l día antes de la cirugía, con la hora de llegada. (Si veras cirugía es un lunes, le llamaremos el viernes antes).  Llame al [phone] después de las 7pm de lunes a viernes, si usted no recibió esta llamada.   INSTRUCCIONES PARA ANTES DE VERAS CIRUGÍA    Cuando   Usted   Llegue  El día de veras cirugía, llegue a la Recepción de Admisiones en el [facility].     Tenga veras tarjeta de seguro, Identificación con fotografía, y cualquier copago (si es necesario).      Comida   y   Bebida  NO puede comer ni ni líquidos después de la media noche antes de la cirugía. [place] quiere decir que NO puede ni agua, masticar, mentas, ni café, jugo, etc.   No puede beber alcohol (cerveza, vino, licor) 24 horas antes y después de la cirugía.      Medicinas que puede   NI la   Mañana de la Cirugïa  LAS MEDICINAS QUE PUEDE NI LA MAÑANA DE LA CIRUGíA CON UN SORBO DE AGUA:       Levothyroxine  Isosorbide  Esomeprazole  Nitroglycerine if needed      DO NOT take:  Valsartan/ HCTZ the morning of surgery.           Las medicinas que debe   DEJAR de ni por 7 días antes de la cirugía  Drogas anti-inflamatorias no esteroides: por ejemplo, Ibuprofeno (Advil, Motrin), Naproxen (Aleve)     Aspirina, si la está tomando para el dolor     Suplementos de Hier[place], vitaminas, y aceite de pescado.     Otro:     (Puede ni Tylenol)      Medicinas Para Diluir la Carly  Si usted sweetie Aspirina, Plavix, Coumadin, o cualquiera medicina para diluir la carly o anticoagulante, hable con el doctor que recetó los medicamentos, para que le dé instrucciones pre-operatorias.     Follow Dr Peterson instructions for Plavix and Aspirin, we will call you.    Bañarse,   vestirse   Joyas,   objetos de valor  Si se ducha la mañana de la cirugía,

## 2024-05-15 NOTE — PERIOP NOTE
Pt reports she was prescribed Plavix and Aspirin by a cardiologist in [place] after \"I was going to have a heart attack\". She continues to take Plavix but \"I decided to stop Aspirin a week ago because my doctor said I was okay.\"  Pt and her daughter were instructed to contact Dr Peterson to get recommendation regarding Aspirin administration.  Pt has pre- op appt with Dr Peterson 5/22/24.     Med plan for Aspirin and Plavix faxed to Dr Peterson office; confirmation received.

## 2024-05-16 ENCOUNTER — CLINICAL DOCUMENTATION (OUTPATIENT)
Age: 74
End: 2024-05-16

## 2024-05-16 DIAGNOSIS — K29.60 OTHER GASTRITIS WITHOUT HEMORRHAGE, UNSPECIFIED CHRONICITY: ICD-10-CM

## 2024-05-16 NOTE — PROGRESS NOTES
Fax received form [facility] for clearance.    Procedure: Hysteroscopy D&C on 5/23/24  Dr: Iman Stoddard  Medication requested to hold: Plavix & ASA    Fax to: [phone]  Ph: [phone]  Office contact BRENDAN Anton

## 2024-05-17 LAB
EKG ATRIAL RATE: 70 BPM
EKG DIAGNOSIS: NORMAL
EKG P AXIS: 59 DEGREES
EKG P-R INTERVAL: 162 MS
EKG Q-T INTERVAL: 396 MS
EKG QRS DURATION: 96 MS
EKG QTC CALCULATION (BAZETT): 427 MS
EKG R AXIS: 11 DEGREES
EKG T AXIS: 37 DEGREES
EKG VENTRICULAR RATE: 70 BPM

## 2024-05-17 NOTE — PERIOP NOTE
Called to Benita Falk using  [phone].. She will reach out to Dr. Peterson office for ASA (clarified that patient is taking ASA) and Plavix instructions. Instructed that if we get instructions from Dr. Peterson, we will call her. Verified that patient does have appt to see Dr. Peterson on 5/22.

## 2024-05-20 NOTE — PERIOP NOTE
Called to patient daughter using  [phone]. Clarified that patient could conitnue Lipitor. Clarified that patient was to hold plavix per Dr. Stoddard instruction starting 5/18/24.

## 2024-05-21 ENCOUNTER — ANESTHESIA EVENT (OUTPATIENT)
Facility: HOSPITAL | Age: 74
End: 2024-05-21
Payer: SUBSIDIZED

## 2024-05-22 ENCOUNTER — TELEPHONE (OUTPATIENT)
Age: 74
End: 2024-05-22

## 2024-05-22 ENCOUNTER — OFFICE VISIT (OUTPATIENT)
Age: 74
End: 2024-05-22
Payer: SUBSIDIZED

## 2024-05-22 VITALS
OXYGEN SATURATION: 98 % | BODY MASS INDEX: 33.01 KG/M2 | SYSTOLIC BLOOD PRESSURE: 146 MMHG | HEART RATE: 80 BPM | WEIGHT: 153 LBS | DIASTOLIC BLOOD PRESSURE: 70 MMHG | HEIGHT: 57 IN

## 2024-05-22 DIAGNOSIS — Z01.810 PREOP CARDIOVASCULAR EXAM: ICD-10-CM

## 2024-05-22 DIAGNOSIS — R07.9 CHEST PAIN, UNSPECIFIED TYPE: Primary | ICD-10-CM

## 2024-05-22 PROCEDURE — 99205 OFFICE O/P NEW HI 60 MIN: CPT | Performed by: SPECIALIST

## 2024-05-22 PROCEDURE — 1123F ACP DISCUSS/DSCN MKR DOCD: CPT | Performed by: SPECIALIST

## 2024-05-22 NOTE — TELEPHONE ENCOUNTER
Patient was seen in the office today for clearance for a Hysteroscopy D&C that she is scheduled for tomorrow 5/23/24 with Dr Stoddard.    Per Dr. Belle Peterson: \"It is ok to hold her Plavix and ASA 5 days prior to procedure and resume the same evening\"    Fax- [phone]/

## 2024-05-22 NOTE — PROGRESS NOTES
Chief Complaint   Patient presents with    Chest Pain    Hypertension     Vitals:    05/22/24 1102 05/22/24 1118   BP: (!) 146/70 (!) 146/70   Site: Left Upper Arm    Position: Sitting    Cuff Size: Medium Adult    Pulse: 80    SpO2: 98%    Weight: 69.4 kg (153 lb)    Height: 1.448 m (4' 9\")       BP (!) 146/70   Pulse 80   Ht 1.448 m (4' 9\")   Wt 69.4 kg (153 lb)   SpO2 98%   BMI 33.11 kg/m²

## 2024-05-22 NOTE — PROGRESS NOTES
Belle Peterson MD. [facility]          Patient: Siobhan Rider  : 1950      Today's Date: 2024        HISTORY OF PRESENT ILLNESS:     History of Present Illness:  Referred for chest pain   Used video .  Daughter helped with history. Prior records reviewed (see below).     Patient and daughter are poor historians.   She was admitted in  at a hospital in [place].  Patient not clear what happened there. Apparently did not have heart attack.  No stent placement per daughter.   Per notes, patient with CP back a few months back.     But patient tells me she is doing well. No More CP.  She feels well and able to do what she wants.  Has been on DAPT.     Hiram Weinstein noted in  - \"was in PreOp 1/15/2024 when she developed chest pain and high blood pressure and so was taken to ED. ECHO and serial EKGs available showed no acute changes. She did have a Troponin 1.09 (elevated) and NL CHELSEY-MD. She saw Cardiology 2 weeks later and was told it was likely not a heart attack. No cath. Was given Plavix, Isosorbide Mononitrate to take \"for prevention\" x 6 months. Patient denies any chest pain, TREJO. Is able to go up 2-3 flights of stairs without any chest pain or pressure. \"        PAST MEDICAL HISTORY:     Past Medical History:   Diagnosis Date    Dyslipidemia     Hypertension     Liver cyst     Thyroid disease        Past Surgical History:   Procedure Laterality Date    CHOLECYSTECTOMY      TUBAL LIGATION               CURRENT MEDICATIONS:    .  Current Outpatient Medications   Medication Sig Dispense Refill    atorvastatin (LIPITOR) 40 MG tablet Take 1 tablet by mouth daily For cholesterol; tome 1 tab diario para colesterol 30 tablet 3    clopidogrel (PLAVIX) 75 MG tablet Take 1 tablet by mouth daily For your heart.  [place] 1 tab cada madhav para veras sonia. 30 tablet 3    esomeprazole (NEXIUM) 40 MG delayed release capsule Take 1 capsule by mouth every morning (before breakfast) As needed for stomach

## 2024-05-22 NOTE — PERIOP NOTE
Spoke to Daughter using Sammarinese interpretor # [phone] regarding arrival time for surgical procedure.  Pt instructed to arrive to [facility] at 0800 am.

## 2024-05-23 ENCOUNTER — HOSPITAL ENCOUNTER (OUTPATIENT)
Facility: HOSPITAL | Age: 74
Setting detail: OUTPATIENT SURGERY
Discharge: HOME | End: 2024-05-23
Attending: OBSTETRICS & GYNECOLOGY | Admitting: OBSTETRICS & GYNECOLOGY

## 2024-05-23 ENCOUNTER — ANESTHESIA (OUTPATIENT)
Facility: HOSPITAL | Age: 74
End: 2024-05-23
Payer: SUBSIDIZED

## 2024-05-23 VITALS
OXYGEN SATURATION: 97 % | RESPIRATION RATE: 20 BRPM | SYSTOLIC BLOOD PRESSURE: 119 MMHG | HEART RATE: 88 BPM | DIASTOLIC BLOOD PRESSURE: 68 MMHG | TEMPERATURE: 98.3 F

## 2024-05-23 PROCEDURE — 2720000010 HC SURG SUPPLY STERILE: Performed by: OBSTETRICS & GYNECOLOGY

## 2024-05-23 PROCEDURE — 3600000014 HC SURGERY LEVEL 4 ADDTL 15MIN: Performed by: OBSTETRICS & GYNECOLOGY

## 2024-05-23 PROCEDURE — 7100000001 HC PACU RECOVERY - ADDTL 15 MIN: Performed by: OBSTETRICS & GYNECOLOGY

## 2024-05-23 PROCEDURE — 6360000002 HC RX W HCPCS: Performed by: NURSE ANESTHETIST, CERTIFIED REGISTERED

## 2024-05-23 PROCEDURE — 2500000003 HC RX 250 WO HCPCS: Performed by: NURSE ANESTHETIST, CERTIFIED REGISTERED

## 2024-05-23 PROCEDURE — 88305 TISSUE EXAM BY PATHOLOGIST: CPT

## 2024-05-23 PROCEDURE — 3700000000 HC ANESTHESIA ATTENDED CARE: Performed by: OBSTETRICS & GYNECOLOGY

## 2024-05-23 PROCEDURE — 6370000000 HC RX 637 (ALT 250 FOR IP): Performed by: OBSTETRICS & GYNECOLOGY

## 2024-05-23 PROCEDURE — 7100000000 HC PACU RECOVERY - FIRST 15 MIN: Performed by: OBSTETRICS & GYNECOLOGY

## 2024-05-23 PROCEDURE — 3600000004 HC SURGERY LEVEL 4 BASE: Performed by: OBSTETRICS & GYNECOLOGY

## 2024-05-23 PROCEDURE — 2709999900 HC NON-CHARGEABLE SUPPLY: Performed by: OBSTETRICS & GYNECOLOGY

## 2024-05-23 PROCEDURE — 2580000003 HC RX 258: Performed by: OBSTETRICS & GYNECOLOGY

## 2024-05-23 PROCEDURE — 7100000011 HC PHASE II RECOVERY - ADDTL 15 MIN: Performed by: OBSTETRICS & GYNECOLOGY

## 2024-05-23 PROCEDURE — 3700000001 HC ADD 15 MINUTES (ANESTHESIA): Performed by: OBSTETRICS & GYNECOLOGY

## 2024-05-23 PROCEDURE — 2580000003 HC RX 258: Performed by: ANESTHESIOLOGY

## 2024-05-23 PROCEDURE — 7100000010 HC PHASE II RECOVERY - FIRST 15 MIN: Performed by: OBSTETRICS & GYNECOLOGY

## 2024-05-23 PROCEDURE — A4217 STERILE WATER/SALINE, 500 ML: HCPCS | Performed by: OBSTETRICS & GYNECOLOGY

## 2024-05-23 RX ORDER — SODIUM CHLORIDE, SODIUM LACTATE, POTASSIUM CHLORIDE, CALCIUM CHLORIDE 600; 310; 30; 20 MG/100ML; MG/100ML; MG/100ML; MG/100ML
INJECTION, SOLUTION INTRAVENOUS CONTINUOUS
Status: DISCONTINUED | OUTPATIENT
Start: 2024-05-23 | End: 2024-05-23 | Stop reason: HOSPADM

## 2024-05-23 RX ORDER — LIDOCAINE HYDROCHLORIDE 20 MG/ML
INJECTION, SOLUTION EPIDURAL; INFILTRATION; INTRACAUDAL; PERINEURAL PRN
Status: DISCONTINUED | OUTPATIENT
Start: 2024-05-23 | End: 2024-05-23 | Stop reason: SDUPTHER

## 2024-05-23 RX ORDER — NALOXONE HYDROCHLORIDE 0.4 MG/ML
INJECTION, SOLUTION INTRAMUSCULAR; INTRAVENOUS; SUBCUTANEOUS PRN
Status: DISCONTINUED | OUTPATIENT
Start: 2024-05-23 | End: 2024-05-23 | Stop reason: HOSPADM

## 2024-05-23 RX ORDER — FENTANYL CITRATE 50 UG/ML
INJECTION, SOLUTION INTRAMUSCULAR; INTRAVENOUS PRN
Status: DISCONTINUED | OUTPATIENT
Start: 2024-05-23 | End: 2024-05-23 | Stop reason: SDUPTHER

## 2024-05-23 RX ORDER — ONDANSETRON 2 MG/ML
INJECTION INTRAMUSCULAR; INTRAVENOUS PRN
Status: DISCONTINUED | OUTPATIENT
Start: 2024-05-23 | End: 2024-05-23 | Stop reason: SDUPTHER

## 2024-05-23 RX ORDER — LIDOCAINE HYDROCHLORIDE 10 MG/ML
1 INJECTION, SOLUTION EPIDURAL; INFILTRATION; INTRACAUDAL; PERINEURAL
Status: DISCONTINUED | OUTPATIENT
Start: 2024-05-23 | End: 2024-05-23 | Stop reason: HOSPADM

## 2024-05-23 RX ORDER — DEXAMETHASONE SODIUM PHOSPHATE 4 MG/ML
INJECTION, SOLUTION INTRA-ARTICULAR; INTRALESIONAL; INTRAMUSCULAR; INTRAVENOUS; SOFT TISSUE PRN
Status: DISCONTINUED | OUTPATIENT
Start: 2024-05-23 | End: 2024-05-23 | Stop reason: SDUPTHER

## 2024-05-23 RX ORDER — MIDAZOLAM HYDROCHLORIDE 1 MG/ML
INJECTION INTRAMUSCULAR; INTRAVENOUS PRN
Status: DISCONTINUED | OUTPATIENT
Start: 2024-05-23 | End: 2024-05-23 | Stop reason: SDUPTHER

## 2024-05-23 RX ORDER — MIDAZOLAM HYDROCHLORIDE 2 MG/2ML
2 INJECTION, SOLUTION INTRAMUSCULAR; INTRAVENOUS
Status: DISCONTINUED | OUTPATIENT
Start: 2024-05-23 | End: 2024-05-23 | Stop reason: HOSPADM

## 2024-05-23 RX ORDER — FENTANYL CITRATE 50 UG/ML
100 INJECTION, SOLUTION INTRAMUSCULAR; INTRAVENOUS
Status: DISCONTINUED | OUTPATIENT
Start: 2024-05-23 | End: 2024-05-23 | Stop reason: HOSPADM

## 2024-05-23 RX ORDER — DIPHENHYDRAMINE HYDROCHLORIDE 50 MG/ML
12.5 INJECTION INTRAMUSCULAR; INTRAVENOUS
Status: DISCONTINUED | OUTPATIENT
Start: 2024-05-23 | End: 2024-05-23 | Stop reason: HOSPADM

## 2024-05-23 RX ORDER — ONDANSETRON 2 MG/ML
4 INJECTION INTRAMUSCULAR; INTRAVENOUS
Status: DISCONTINUED | OUTPATIENT
Start: 2024-05-23 | End: 2024-05-23 | Stop reason: HOSPADM

## 2024-05-23 RX ORDER — PROPOFOL 10 MG/ML
INJECTION, EMULSION INTRAVENOUS PRN
Status: DISCONTINUED | OUTPATIENT
Start: 2024-05-23 | End: 2024-05-23 | Stop reason: SDUPTHER

## 2024-05-23 RX ORDER — PHENYLEPHRINE HCL IN 0.9% NACL 0.4MG/10ML
SYRINGE (ML) INTRAVENOUS PRN
Status: DISCONTINUED | OUTPATIENT
Start: 2024-05-23 | End: 2024-05-23 | Stop reason: SDUPTHER

## 2024-05-23 RX ADMIN — DEXAMETHASONE SODIUM PHOSPHATE 4 MG: 4 INJECTION, SOLUTION INTRAMUSCULAR; INTRAVENOUS at 10:30

## 2024-05-23 RX ADMIN — Medication 80 MCG: at 10:29

## 2024-05-23 RX ADMIN — FENTANYL CITRATE 50 MCG: 50 INJECTION, SOLUTION INTRAMUSCULAR; INTRAVENOUS at 10:23

## 2024-05-23 RX ADMIN — SODIUM CHLORIDE, POTASSIUM CHLORIDE, SODIUM LACTATE AND CALCIUM CHLORIDE: 600; 310; 30; 20 INJECTION, SOLUTION INTRAVENOUS at 09:41

## 2024-05-23 RX ADMIN — Medication 80 MCG: at 10:32

## 2024-05-23 RX ADMIN — LIDOCAINE HYDROCHLORIDE 60 MG: 20 INJECTION, SOLUTION EPIDURAL; INFILTRATION; INTRACAUDAL; PERINEURAL at 10:26

## 2024-05-23 RX ADMIN — FENTANYL CITRATE 50 MCG: 50 INJECTION, SOLUTION INTRAMUSCULAR; INTRAVENOUS at 10:43

## 2024-05-23 RX ADMIN — MIDAZOLAM HYDROCHLORIDE 1 MG: 1 INJECTION, SOLUTION INTRAMUSCULAR; INTRAVENOUS at 10:17

## 2024-05-23 RX ADMIN — Medication 80 MCG: at 10:43

## 2024-05-23 RX ADMIN — ONDANSETRON 4 MG: 2 INJECTION INTRAMUSCULAR; INTRAVENOUS at 10:48

## 2024-05-23 RX ADMIN — PROPOFOL 120 MG: 10 INJECTION, EMULSION INTRAVENOUS at 10:26

## 2024-05-23 ASSESSMENT — PAIN SCALES - GENERAL
PAINLEVEL_OUTOF10: 0
PAINLEVEL_OUTOF10: 0

## 2024-05-23 ASSESSMENT — PAIN - FUNCTIONAL ASSESSMENT: PAIN_FUNCTIONAL_ASSESSMENT: 0-10

## 2024-05-23 NOTE — TELEPHONE ENCOUNTER
The pt is currently admitted to the hospital. The refill message was sent to her provider. Radha Abbasi RN

## 2024-05-23 NOTE — PERIOP NOTE
TELEHEALTH EVALUATION -- Audio/Visual (During SSDVQ-76 public health emergency)    Due to COVID 19 outbreak, patient's office visit was converted to a virtual visit. Patient was contacted and agreed to proceed with a virtual visit via  Verbal during patient  lunch   The risks and benefits of converting to a virtual visit were discussed in light of the current infectious disease epidemic. Patient also understood that insurance coverage and co-pays are up to their individual insurance plans. HPI:    [facility] (:  1999) has requested an audio/video evaluation for the following concern(s):  Chief Complaint   Patient presents with    Vaginitis       Reporting abnormal vaginal discharge for 2 to 3 days. Reports gray color mild odor. She denies vaginal pain bleeding itching or burning. She denies sores or ulcerations. 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 Respiratory: Negative. Cardiovascular: Negative. Gastrointestinal:  Negative for abdominal pain and nausea. Endocrine: Negative. Negative for polyphagia and polyuria. Genitourinary:  Positive for vaginal discharge. Negative for difficulty urinating, dysuria, flank pain, frequency, hematuria and urgency. Musculoskeletal:  Negative for back pain and myalgias. Skin: Negative. Neurological:  Negative for dizziness and light-headedness. Psychiatric/Behavioral: Negative. Negative for agitation. PAST MEDICAL HISTORY   History reviewed. No pertinent past medical history. SURGICAL HISTORY     Patient  has a past surgical history that includes Dilation and curettage of uterus and Finger surgery.   CURRENT MEDICATIONS       Previous Medications    CLOTRIMAZOLE (LOTRIMIN) 1 % CREAM POST ANESTHESIA CARE    DISCHARGE / TRANSFER NOTE  Siobhan Rider was:    [x] discharged        via   [x] Wheelchair          to [x] Private Vehicle     [] transferred    [] Carried    [] Taxi / Vehicle \"for Hire\"  [] Walk out   [] Ambulance / Medical Transportation   [] Stretcher   [] Hospital room _**_           [] Bed      Patient was escorted by:      [x] Nurse   [] Volunteer  [] Transporter / Technician  [] Parent      [] Spouse / Family /      Patient verbalized     [x] appreciation and was very pleased with care received   [] frustration with care received       throughout their stay.    Patient was discharged in     [x] pleasant mood  [] sad mood  [] mad mood .     Pain at discharge/transfer was      0  /10.    Discharge, medication and follow-up instructions were verbalized as understood prior to discharge  (if applicable for same-day procedures being discharged.)    All personal belongings have been returned to patient, and patient/family verbally confirm receiving belongings as all present.       MIRTAZAPINE (REMERON) 7.5 MG TABLET        MULTIPLE VITAMIN (MULTI-VITAMINS) TABS        VITAMIN D (ERGOCALCIFEROL) 1.25 MG (49957 UT) CAPS CAPSULE         ALLERGIES     Patient is has No Known Allergies. FAMILY HISTORY     Patient'sfamily history includes High Blood Pressure in her mother; Thyroid Disease in her mother. HISTORY     Patient  reports that she has been smoking. She has been exposed to tobacco smoke. She has never used smokeless tobacco. She reports current alcohol use. She reports that she does not use drugs. PHYSICAL EXAMINATION:  Patient-Reported Vitals 11/21/2022   Patient-Reported Weight 94 lb   Patient-Reported Height 5'2   Patient-Reported Pulse 76   Patient-Reported Temperature 97.5   Patient-Reported SpO2 99         No exam  Phone visit  Patient in no significant distress, conversant    Due to this being a TeleHealth encounter, evaluation of the following organ systems is limited: Vitals/Constitutional/EENT/Resp/CV/GI//MS/Neuro/Skin/Heme-Lymph-Imm. No results found for: WBC, HGB, HCT, PLT, CHOL, TRIG, HDL, LDLDIRECT, ALT, AST, NA, K, CL, CREATININE, BUN, CO2, TSH, PSA, INR, GLUF, LABA1C, LABMICR    10 minute interview    ASSESSMENT/PLAN:     Diagnosis Orders   1. Vaginal discharge  C.trachomatis N.gonorrhoeae DNA, Urine    Wet Prep, Genital          Orders placed for GC chlamydia, wet prep. Patient self collected wet prep. Return if symptoms worsen or fail to improve. An  electronic signature was used to authenticate this note. --Kalina Oliva, IBIS - CNP on 11/21/2022 at 4:24 PM          Pursuant to the emergency declaration under the [facility], [facility]5 waiver authority and the [employer] Act, this Virtual  Visit was conducted, with patient's consent, to reduce the patient's risk of exposure to COVID-19 and provide continuity of care for an established patient.     Kandy Coulter is a 26 y.o. female evaluated via telephone on 11/21/2022 for Vaginitis  . Documentation:  I communicated with the patient and/or health care decision maker about patient's symptoms type of testing required, possible interventions depending on test results. .   Details of this discussion including any medical advice provided: Abstain from sexual practice until results are received. Total Time: minutes: 5-10 minutes    Azra Ortiz was evaluated through a synchronous (real-time) audio encounter. Patient identification was verified at the start of the visit. She (or guardian if applicable) is aware that this is a billable service, which includes applicable co-pays. This visit was conducted with the patient's (and/or legal guardian's) verbal consent. She has not had a related appointment within my department in the past 7 days or scheduled within the next 24 hours. The patient was located at Other: [facility] . The provider was located at [facility] (Appt Dept): [facility] [address]     Note: not billable if this call serves to triage the patient into an appointment for the relevant concern    Florencia Tsai, APRN - CNP

## 2024-05-23 NOTE — OP NOTE
HYSTEROSCOPY D & C FULL OP NOTE        Patient - Siobhan Rider  Medical Record Number - [id number]   YOB: 1950      DATE AND TIME OF PROCEDURE: [unfilled]   2:13 PM     PREOPERATIVE DIAGNOSIS: Thickened endometrium [R93.89]    POSTOPERATIVE DIAGNOSIS: * No post-op diagnosis entered *    PROCEDURE: Procedure(s):  HYSTEROSCOPY, DILATATION AND CURETTAGE     SURGEON:  TREY BLANCO MD    ASSISTANT: None     ANESTHESIA: General     QUANTITATIVE BLOOD LOSS AT PROCEDURE END: Minimal     COMPLICATIONS: * No complications entered in OR log *     IMPLANTS: *No implants in the log*    SPECIMENS:     FINDINGS: endometrial polyp noted  Bilateral ostia patent  Atrophic appearing endometrium     DESCRIPTION OF PROCEDURE:Patient was placed on the operating table in the supine position. Time out was done to confirm the operating procedure, surgeon, patient and site. She was laid supine on the operative table. SCDs were placed. Once anesthesia was found to be adequate, she was positioned in dorsal lithotomy using yellow-fin stirrups. She was then prepped and draped in the usual sterile fashion.     The speculum was inserted into the vagina. The anterior lip of the cervix was grasped with a tenaculum. The cervix was progressively dilated to accommodate the Myosure device. The myosure was then inserted under direct visualization using saline as the distending medium. The Myosure was then used to resect the polyp and perform directed biopsy. The device was then removed, and the tenaculum was removed. Hemostasis was noted. The speculum was removed, the patient was taken out of dorsal lithotomy and taken to the PACU in stable condition once awakened from anesthesia.     Counts correct x 2.

## 2024-05-23 NOTE — H&P
tablets in the evening. For your heart.  [place] la mitad por boca cada 12 horas para veras sonia.. 3/19/24   Rosa Brennan APRN - NP   levothyroxine (SYNTHROID) 25 MCG tablet Take 1 tablet by mouth daily For your thyroid.  [place] 1 por boca diario para tiroides.  Patient not taking: Reported on 5/22/2024 3/19/24   Rosa Brennan APRN - NP   valsartan-hydroCHLOROthiazide (DIOVAN-HCT) 320-12.5 MG per tablet Take 1 tablet by mouth daily For blood pressure.  [place] 1 tab por bocmarilyn diario para presion thor. 3/19/24   Rosa Brennan APRN - NP        Review of Systems:  A comprehensive review of systems was negative except for that written in the History of Present Illness.     Objective:     Vitals:  BP (!) 149/72   Pulse 96   Temp 98.4 °F (36.9 °C) (Oral)   Resp 22   SpO2 99%     CONSTITUTIONAL:  awake, alert, cooperative, no apparent distress, and appears stated age  LUNGS:  No increased work of breathing, good air exchange, clear to auscultation bilaterally, no crackles or wheezing  CARDIOVASCULAR:  Normal apical impulse, regular rate and rhythm, normal S1 and S2, no S3 or S4, and no murmur noted    Assessment:     Active Problems:    * No active hospital problems. *  Resolved Problems:    * No resolved hospital problems. *     Endometrial thickening     Plan:     Procedure(s) (LRB):  HYSTEROSCOPY, DILATATION AND CURETTAGE (N/A)  Discussed the risks of surgery including the risks of bleeding, infection, deep vein thrombosis, and surgical injuries to internal organs including but not limited to the bowels, bladder, rectum, and female reproductive organs. The patient understands the risks; any and all questions were answered to the patient's satisfaction.

## 2024-05-23 NOTE — ANESTHESIA POSTPROCEDURE EVALUATION
Department of Anesthesiology  Postprocedure Note    Patient: Siobhan Rider  MRN: [id number]  YOB: 1950  Date of evaluation: 5/23/2024    Procedure Summary       Date: 05/23/24 Room / Location: [facility] ASU OR  / [facility] AMBULATORY OR    Anesthesia Start: 1017 Anesthesia Stop: 1104    Procedure: HYSTEROSCOPY, DILATATION AND CURETTAGE (Vagina ) Diagnosis:       Thickened endometrium      (Thickened endometrium [R93.89])    Surgeons: Iman Stoddard MD Responsible Provider: Viraj Luque MD    Anesthesia Type: general ASA Status: 2            Anesthesia Type: No value filed.    Reid Phase I: Reid Score: 9    Reid Phase II: Reid Score: 9    Anesthesia Post Evaluation    Patient location during evaluation: PACU  Patient participation: complete - patient participated  Level of consciousness: awake  Pain score: 0  Airway patency: patent  Nausea & Vomiting: no nausea and no vomiting  Cardiovascular status: blood pressure returned to baseline  Respiratory status: acceptable  Hydration status: euvolemic  Pain management: adequate    No notable events documented.

## 2024-05-23 NOTE — DISCHARGE INSTRUCTIONS
SEE DR BLANCO's PRE-PRINTED INSTRUCTIONS  (COPY MADE TO PAPER CHART RECORD)    ___________________________________________        DISCHARGE SUMMARY from Your Nurse    PATIENT INSTRUCTIONS:    AFTER ANESTHESIA & SEDATION, and WHILE TAKING PAIN MEDICINE  After general anesthesia / intravenous sedation and the 24 hours following, and/or while taking prescription Opiates:  Limit your activities  Do not drive and operate hazardous machinery until you have been of all narcotics and sedatives for over 24 hours  Do not make important personal or business decisions  Do  not drink alcoholic beverages  If you have not urinated within 8 hours after discharge, please contact your surgeon on call.        SIGNS OF INFECTION, THINGS TO REPORT TO YOUR DOCTOR  Report the following Signs of Infection or General Problems after surgery to your surgeon:  Redness, swelling, drainage, pus or odor of or around the surgical area  Excessive pain not relieved, or discomfort not improved by the medications prescribed by your doctor  Fever/ temperature over 101; Temperature over 100 if on medications (chemotherapy or medicines which affect your ability to fight infections)  Nausea and vomiting lasting longer than 4 hours or if unable to take medications  Any signs of decreased circulation or nerve impairment to extremity: change in color, persistent  numbness, tingling, coldness or increase pain  If you have any questions.      GOOD HELP TO FIGHT AN INFECTION  Here are a few tip to help reduce the chance of getting an infection after surgery:  Wash Your Hands  Good handwashing is the most important thing you and your caregiver can do.  Wash before and after caring for any wounds.  Dry your hand with a clean towel.  Wash with soap and water for at least 20 seconds. A TIP: sing the \"Happy Birthday\" song through one time while washing to help with the timing.  Use a hand  in between washings.    Shower  When your surgeon says it is OK to

## 2024-05-23 NOTE — PERIOP NOTE
PACU-IN REPORT FROM ANESTHESIA    Verbal report received from   Jannette   [] MD/DO-Anesthesiologist    [x] CRNA   [] with student    CHOICE ANESTHESIA:  [x] GENERAL  [] TIVA  [] MAC  [] LOCAL  [] REGIONAL  [] SPINAL   [] EPIDURAL   **Note the anesthesia record for medications given intraoperatively.**           [] E.R.A.S. PROTOCOL    SURGICAL PROCEDURE: Procedure(s) (LRB):  HYSTEROSCOPY, DILATATION AND CURETTAGE (N/A)     SURGEON: Iman Stoddard MD.    Brief Initial Visual Assessment:    Patient Age: [] Infant(1-12mo)       []Pediatric(1-13yrs)    [] Adolescent(13-18yrs)     [] Adult(18-65yrs)      [x]Geriatric Adult(>65yrs).                   Patient    [] Alert           [x]Calm & Cooperative      [] Anxious/Restless      [] Combative  Appearance:  [x] Drowsy      [] Confused/Disoriented     [] Sedated      [] Unresponsive     Oriented x  1            Airway:     [x] Patent          [] \"Difficult Airway\" report by Anesthesia                        [] Obstructs easily/Obstructed on arrival          [] Manual stimulation and/or airway assistance necessary                         [] Airway improved with head/airway repositioning                       Airway Adjuncts Present: [] Oral Airway    [] Nasal Trumpet    [] ETT    [] LMA            Respiratory  [x] Even   [] Labored   [] Shallow   [] Tachypnea (>28 RR/min)  [] Bradypnea (<10 RR/min)  Pattern:    [x] Non-Labored  [] VENT and/or respiratory assistance     being provided.        Skin:     [x] Pink [] Dusky    [] Pale         [x] Warm     [] Hot [] Cool       [] Cold    [x]Dry     [] Moist [] Diaphoretic     Membranes:  [x] Pink    [] Pale        [x] Moist [] Dry     [] Crusty     Pain:   [x] No Acute Discomfort.    0  /10 Scale [x] Verbal Numeric / Visual   [] Moderate Discomfort.      [] V.A.S.    [] Acute Discomfort.                  [] A.N.V.    [] Chronic-Issue Related Discomfort.   [] F.L.A.C.C.   Note E-MAR for medications administered.

## 2024-05-23 NOTE — ANESTHESIA PRE PROCEDURE
Department of Anesthesiology  Preprocedure Note       Name:  Siobhan Rider   Age:  74 y.o.  :  1950                                          MRN:  [id number]         Date:  2024      Surgeon: Surgeon(s):  Iman Stoddard MD    Procedure: Procedure(s):  HYSTEROSCOPY, DILATATION AND CURETTAGE    Medications prior to admission:   Prior to Admission medications    Medication Sig Start Date End Date Taking? Authorizing Provider   aspirin 81 MG EC tablet Take 1 tablet by mouth daily For your heart.  [place] 1 tab diario para veras sonia. 3/19/24   Rosa Brennan APRN - NP   atorvastatin (LIPITOR) 40 MG tablet Take 1 tablet by mouth daily For cholesterol; tome 1 tab diario para colesterol 3/19/24   Rosa Brennan, APRN - NP   clopidogrel (PLAVIX) 75 MG tablet Take 1 tablet by mouth daily For your heart.  [place] 1 tab cada madhav para veras sonia. 3/19/24   Rosa Brennan APRN - NP   esomeprazole (NEXIUM) 40 MG delayed release capsule Take 1 capsule by mouth every morning (before breakfast) As needed for stomach pain.  [place] 1 cap por boca antes de desayunar si necesita para dolor del estomago. 3/19/24   Rosa Brennan APRN - NP   isosorbide mononitrate (ISMO;MONOKET) 20 MG tablet Take 0.5 tablets by mouth in the morning and 0.5 tablets in the evening. For your heart.  [place] la mitad por boca cada 12 horas para veras sonia.. 3/19/24   Rosa Brennan APRN - NP   levothyroxine (SYNTHROID) 25 MCG tablet Take 1 tablet by mouth daily For your thyroid.  [place] 1 por boca diario para tiroides.  Patient not taking: Reported on 2024 3/19/24   Rosa Brennan APRN - NP   valsartan-hydroCHLOROthiazide (DIOVAN-HCT) 320-12.5 MG per tablet Take 1 tablet by mouth daily For blood pressure.  [place] 1 tab por boca diario para presion thor. 3/19/24   Rosa Brennan APRN - NP       Current medications:    No current facility-administered medications for this encounter.       Allergies:  No Known Allergies    Problem List:

## 2024-05-24 ENCOUNTER — TELEPHONE (OUTPATIENT)
Age: 74
End: 2024-05-24

## 2024-05-24 NOTE — TELEPHONE ENCOUNTER
Grandson calling on her behalf, he says she just had a procedure done on yesterday and want to know if she is able to take her arthritis medication (Atorvastatin 40Mg) (Levothyroxine 25MG) (Valsartan 12.5 MG)      Phone [phone]

## 2024-06-11 ENCOUNTER — TELEMEDICINE (OUTPATIENT)
Age: 74
End: 2024-06-11

## 2024-06-11 DIAGNOSIS — E78.00 PURE HYPERCHOLESTEROLEMIA: ICD-10-CM

## 2024-06-11 DIAGNOSIS — E03.9 HYPOTHYROIDISM, UNSPECIFIED TYPE: ICD-10-CM

## 2024-06-11 DIAGNOSIS — K29.60 OTHER GASTRITIS WITHOUT HEMORRHAGE, UNSPECIFIED CHRONICITY: ICD-10-CM

## 2024-06-11 DIAGNOSIS — I10 HYPERTENSION, UNSPECIFIED TYPE: Primary | ICD-10-CM

## 2024-06-11 PROCEDURE — 99443 PR PHYS/QHP TELEPHONE EVALUATION 21-30 MIN: CPT | Performed by: FAMILY MEDICINE

## 2024-06-11 RX ORDER — LEVOTHYROXINE SODIUM 0.03 MG/1
25 TABLET ORAL DAILY
Qty: 90 TABLET | Refills: 1 | Status: SHIPPED | OUTPATIENT
Start: 2024-06-11

## 2024-06-11 RX ORDER — ATORVASTATIN CALCIUM 40 MG/1
40 TABLET, FILM COATED ORAL DAILY
Qty: 30 TABLET | Refills: 3 | Status: SHIPPED | OUTPATIENT
Start: 2024-06-11

## 2024-06-11 RX ORDER — ESOMEPRAZOLE MAGNESIUM 40 MG/1
40 CAPSULE, DELAYED RELEASE ORAL
Qty: 30 CAPSULE | Refills: 1 | Status: SHIPPED | OUTPATIENT
Start: 2024-06-11

## 2024-06-11 RX ORDER — VALSARTAN AND HYDROCHLOROTHIAZIDE 320; 12.5 MG/1; MG/1
1 TABLET, FILM COATED ORAL DAILY
Qty: 30 TABLET | Refills: 5 | Status: SHIPPED | OUTPATIENT
Start: 2024-06-11

## 2024-06-11 RX ORDER — ESOMEPRAZOLE MAGNESIUM 40 MG/1
CAPSULE, DELAYED RELEASE ORAL
Qty: 30 CAPSULE | Refills: 0 | OUTPATIENT
Start: 2024-06-11

## 2024-06-11 SDOH — ECONOMIC STABILITY: INCOME INSECURITY: IN THE LAST 12 MONTHS, WAS THERE A TIME WHEN YOU WERE NOT ABLE TO PAY THE MORTGAGE OR RENT ON TIME?: NO

## 2024-06-11 SDOH — HEALTH STABILITY: PHYSICAL HEALTH: ON AVERAGE, HOW MANY DAYS PER WEEK DO YOU ENGAGE IN MODERATE TO STRENUOUS EXERCISE (LIKE A BRISK WALK)?: 0 DAYS

## 2024-06-11 SDOH — ECONOMIC STABILITY: HOUSING INSECURITY: IN THE LAST 12 MONTHS, HOW MANY PLACES HAVE YOU LIVED?: 1

## 2024-06-11 SDOH — HEALTH STABILITY: PHYSICAL HEALTH: ON AVERAGE, HOW MANY MINUTES DO YOU ENGAGE IN EXERCISE AT THIS LEVEL?: 0 MIN

## 2024-06-11 ASSESSMENT — PATIENT HEALTH QUESTIONNAIRE - PHQ9
SUM OF ALL RESPONSES TO PHQ QUESTIONS 1-9: 0
SUM OF ALL RESPONSES TO PHQ QUESTIONS 1-9: 0
1. LITTLE INTEREST OR PLEASURE IN DOING THINGS: NOT AT ALL
SUM OF ALL RESPONSES TO PHQ QUESTIONS 1-9: 0
2. FEELING DOWN, DEPRESSED OR HOPELESS: NOT AT ALL
SUM OF ALL RESPONSES TO PHQ9 QUESTIONS 1 & 2: 0
SUM OF ALL RESPONSES TO PHQ QUESTIONS 1-9: 0

## 2024-06-11 ASSESSMENT — SOCIAL DETERMINANTS OF HEALTH (SDOH)
WITHIN THE LAST YEAR, HAVE YOU BEEN KICKED, HIT, SLAPPED, OR OTHERWISE PHYSICALLY HURT BY YOUR PARTNER OR EX-PARTNER?: NO
WITHIN THE LAST YEAR, HAVE YOU BEEN AFRAID OF YOUR PARTNER OR EX-PARTNER?: NO
WITHIN THE LAST YEAR, HAVE YOU BEEN HUMILIATED OR EMOTIONALLY ABUSED IN OTHER WAYS BY YOUR PARTNER OR EX-PARTNER?: NO
WITHIN THE LAST YEAR, HAVE TO BEEN RAPED OR FORCED TO HAVE ANY KIND OF SEXUAL ACTIVITY BY YOUR PARTNER OR EX-PARTNER?: NO

## 2024-06-11 ASSESSMENT — LIFESTYLE VARIABLES
HOW MANY STANDARD DRINKS CONTAINING ALCOHOL DO YOU HAVE ON A TYPICAL DAY: PATIENT DOES NOT DRINK
HOW OFTEN DO YOU HAVE A DRINK CONTAINING ALCOHOL: NEVER

## 2024-06-11 NOTE — PROGRESS NOTES
Chief Complaint   Patient presents with    Medication Refill     Patient is requesting a refill of esomeprazole, atorvastatin, synthroid (not on current med list), and valsartan/hydrochlorothiazide    Hypertension     Patient's blood pressure is always around 117 systolic so she is wondering if she can stop taking her BP medication.     \"Have you been to the ER, urgent care clinic since your last visit?  Hospitalized since your last visit?\"    NO    “Have you seen or consulted any other health care providers outside of [facility] since your last visit?”    NO    Have you had a mammogram?”   YES - listed below    Date of last Mammogram: 9/20/2019         “Have you had a colorectal cancer screening such as a colonoscopy/FIT/Cologuard?    FIT - below  Colonoscopy two or three years ago in [place]    No colonoscopy on file  No cologuard on file  Date of last FIT: 9/20/2019   No flexible sigmoidoscopy on file         Click Here for Release of Records Request    
Name and  confirmed w/ patient. All discharge instructions were reviewed with the patient including: refills. Time for questions and answers provided, patient verbalized understanding. [facility]  #[id number] assisted with interpretation.   
lightheadedness.  Home -128  HLD:  Patient is taking Lipitor 40mg regularly.  Hypothyroid: w hx of multinodular goiter.  Patient is taking Levothyroxine 25 mcg daily.     Shx: no smoking, no alcohol  PMHx:  Cholecystectomy (6 yrs ago)  BTL    Review of Systems     Patient-Reported Vitals  No data recorded     Physical Exam    On this date 6/11/2024 I have spent 27 minutes reviewing previous notes, test results and face to face (virtual) with the patient discussing the diagnosis and importance of compliance with the treatment plan as well as documenting on the day of the visit.  Siobhan Rider, was evaluated through a synchronous (real-time) audio-video encounter. The patient (or guardian if applicable) is aware that this is a billable service, which includes applicable co-pays. This Virtual Visit was conducted with patient's (and/or legal guardian's) consent. Patient identification was verified, and a caregiver was present when appropriate.   The patient was located at Home: [address] [place] [id number]  Provider was located at Home [place]Appt [place] State[place]  Confirm you are appropriately licensed, registered, or certified to deliver care in the state where the patient is located as indicated above. If you are not or unsure, please re-schedule the visit: Yes, I confirm.      Patient identification was verified at the start of the visit: yes    Services were provided through a phone synchronous discussion virtually to substitute for in-person clinic visit. Patient was located at home and provider was located in office or at home.     An electronic signature was used to authenticate this note.  -- Noah Gandhi MD

## 2024-06-27 ENCOUNTER — OFFICE VISIT (OUTPATIENT)
Age: 74
End: 2024-06-27
Payer: SUBSIDIZED

## 2024-06-27 VITALS
SYSTOLIC BLOOD PRESSURE: 134 MMHG | BODY MASS INDEX: 33.78 KG/M2 | HEART RATE: 82 BPM | DIASTOLIC BLOOD PRESSURE: 72 MMHG | WEIGHT: 156.6 LBS | HEIGHT: 57 IN | OXYGEN SATURATION: 98 %

## 2024-06-27 DIAGNOSIS — I10 HYPERTENSION, UNSPECIFIED TYPE: Primary | ICD-10-CM

## 2024-06-27 DIAGNOSIS — R07.9 CHEST PAIN, UNSPECIFIED TYPE: ICD-10-CM

## 2024-06-27 PROCEDURE — 99214 OFFICE O/P EST MOD 30 MIN: CPT

## 2024-06-27 PROCEDURE — 3075F SYST BP GE 130 - 139MM HG: CPT

## 2024-06-27 PROCEDURE — 3078F DIAST BP <80 MM HG: CPT

## 2024-06-27 PROCEDURE — 1123F ACP DISCUSS/DSCN MKR DOCD: CPT

## 2024-06-27 RX ORDER — VALSARTAN AND HYDROCHLOROTHIAZIDE 320; 12.5 MG/1; MG/1
0.5 TABLET, FILM COATED ORAL DAILY
Qty: 90 TABLET | Refills: 3 | Status: SHIPPED | OUTPATIENT
Start: 2024-06-27

## 2024-06-27 RX ORDER — VALSARTAN AND HYDROCHLOROTHIAZIDE 320; 12.5 MG/1; MG/1
0.5 TABLET, FILM COATED ORAL DAILY
Qty: 90 TABLET | Refills: 3 | Status: SHIPPED | OUTPATIENT
Start: 2024-06-27 | End: 2024-06-27

## 2024-06-27 NOTE — PROGRESS NOTES
Chief Complaint   Patient presents with    Chest Pain     Vitals:    06/27/24 0942   BP: 134/72   Site: Left Upper Arm   Position: Sitting   Pulse: 82   SpO2: 98%   Weight: 71 kg (156 lb 9.6 oz)   Height: 1.448 m (4' 9\")         Chest pain: DENIED     Recent hospital stays: DENIED     Refills: Nexium (possibly?)-- would like to discuss if patient needs to continue.      - Kasey #[id number]     Patient states that she is only taking half of the Valsartan-Hydrochlorothiazide due to low BP readings.    
      PHYSICAL EXAM:     Physical Exam:  /72 (Site: Left Upper Arm, Position: Sitting)   Pulse 82   Ht 1.448 m (4' 9\")   Wt 71 kg (156 lb 9.6 oz)   SpO2 98%   BMI 33.89 kg/m²     Patient appears generally well, mood and affect are appropriate and pleasant.  HEENT:  Hearing intact, non-icteric, normocephalic, atraumatic.   Neck Exam: Supple, No JVD   Lung Exam: Clear to auscultation, even breath sounds.   Cardiac Exam: Regular rate and rhythm with no murmur or rub  Abdomen: Soft, non-tender  Extremities: Moves all ext well. No lower extremity edema.  MSKTL: Overall good ROM ext  Skin: No significant rashes  Psych: Appropriate affect  Neuro - Grossly intact        LABS / OTHER STUDIES:     Labs 2/12/24 - CBC OK, BNP < 10          CARDIAC DIAGNOSTICS:     Cardiac Evaluation Includes:  I reviewed the test results below.    EKG 2/13/24 - NSR, normal   EKG 5/17/24 - NSR, normal       Echo 1/15/24 - LVEF 58%, normal RV        ASSESSMENT AND PLAN:     Assessment and Plan:    Prior Chest pain  - patient and daughter are poor historians   - She apparently had some Chest pain back in Jan/Feb 2024 in [place] --> I reviewed some records from there which included a normal EKG and normal Echo.  BNP also normal.  Dr Gandhi noted \"She did have a Troponin 1.09 (elevated) and CAMMIE BARBOSA-MD. She saw Cardiology 2 weeks later and was told it was likely not a heart attack. No cath.\"   Was given plavix and Isordil to take as prevention for 6 months.   - Patient denies any recent chest pain to me and workload > 4 METS.   - for now continue with a statin, plavix, Isordil, BP control  --- if BP remains high, can then add Coreg next   - If she has further chest pain, then check an exercise cardiolite   - on 6/27/24 - did stop plavix today since it has been about 6 months - she denies further chest pain    Preop Evaluation   - Hysteroscopy D&C scheduled for tomorrow 5/23/24 with Dr Stoddard  - She denies any recent CP to me.   Can walk a

## 2024-07-12 ENCOUNTER — OFFICE VISIT (OUTPATIENT)
Age: 74
End: 2024-07-12

## 2024-07-12 ENCOUNTER — HOSPITAL ENCOUNTER (OUTPATIENT)
Facility: HOSPITAL | Age: 74
Setting detail: SPECIMEN
Discharge: HOME | End: 2024-07-15

## 2024-07-12 VITALS
OXYGEN SATURATION: 100 % | DIASTOLIC BLOOD PRESSURE: 56 MMHG | SYSTOLIC BLOOD PRESSURE: 128 MMHG | BODY MASS INDEX: 33.33 KG/M2 | HEIGHT: 57 IN | HEART RATE: 81 BPM | TEMPERATURE: 98.1 F | WEIGHT: 154.5 LBS

## 2024-07-12 DIAGNOSIS — R73.03 PREDIABETES: ICD-10-CM

## 2024-07-12 DIAGNOSIS — E03.9 HYPOTHYROIDISM, UNSPECIFIED TYPE: ICD-10-CM

## 2024-07-12 DIAGNOSIS — I10 HYPERTENSION, UNSPECIFIED TYPE: Primary | ICD-10-CM

## 2024-07-12 DIAGNOSIS — I10 HYPERTENSION, UNSPECIFIED TYPE: ICD-10-CM

## 2024-07-12 DIAGNOSIS — E78.00 PURE HYPERCHOLESTEROLEMIA: ICD-10-CM

## 2024-07-12 LAB
ERYTHROCYTE [DISTWIDTH] IN BLOOD BY AUTOMATED COUNT: 13.1 % (ref 11.5–14.5)
EST. AVERAGE GLUCOSE BLD GHB EST-MCNC: 117 MG/DL
HBA1C MFR BLD: 5.7 % (ref 4–5.6)
HCT VFR BLD AUTO: 40.8 % (ref 35–47)
HGB BLD-MCNC: 13.9 G/DL (ref 11.5–16)
MCH RBC QN AUTO: 29.9 PG (ref 26–34)
MCHC RBC AUTO-ENTMCNC: 34.1 G/DL (ref 30–36.5)
MCV RBC AUTO: 87.7 FL (ref 80–99)
NRBC # BLD: 0 K/UL (ref 0–0.01)
NRBC BLD-RTO: 0 PER 100 WBC
PLATELET # BLD AUTO: 260 K/UL (ref 150–400)
PMV BLD AUTO: 11.4 FL (ref 8.9–12.9)
RBC # BLD AUTO: 4.65 M/UL (ref 3.8–5.2)
WBC # BLD AUTO: 9.6 K/UL (ref 3.6–11)

## 2024-07-12 PROCEDURE — 3078F DIAST BP <80 MM HG: CPT | Performed by: FAMILY MEDICINE

## 2024-07-12 PROCEDURE — 99214 OFFICE O/P EST MOD 30 MIN: CPT | Performed by: FAMILY MEDICINE

## 2024-07-12 PROCEDURE — 83036 HEMOGLOBIN GLYCOSYLATED A1C: CPT

## 2024-07-12 PROCEDURE — 85027 COMPLETE CBC AUTOMATED: CPT

## 2024-07-12 PROCEDURE — 84443 ASSAY THYROID STIM HORMONE: CPT

## 2024-07-12 PROCEDURE — 80053 COMPREHEN METABOLIC PANEL: CPT

## 2024-07-12 PROCEDURE — 80061 LIPID PANEL: CPT

## 2024-07-12 PROCEDURE — 1123F ACP DISCUSS/DSCN MKR DOCD: CPT | Performed by: FAMILY MEDICINE

## 2024-07-12 PROCEDURE — 3074F SYST BP LT 130 MM HG: CPT | Performed by: FAMILY MEDICINE

## 2024-07-12 PROCEDURE — 36415 COLL VENOUS BLD VENIPUNCTURE: CPT

## 2024-07-12 ASSESSMENT — PATIENT HEALTH QUESTIONNAIRE - PHQ9
SUM OF ALL RESPONSES TO PHQ QUESTIONS 1-9: 0
SUM OF ALL RESPONSES TO PHQ9 QUESTIONS 1 & 2: 0
2. FEELING DOWN, DEPRESSED OR HOPELESS: NOT AT ALL
SUM OF ALL RESPONSES TO PHQ QUESTIONS 1-9: 0
1. LITTLE INTEREST OR PLEASURE IN DOING THINGS: NOT AT ALL

## 2024-07-12 ASSESSMENT — ENCOUNTER SYMPTOMS: COUGH: 0

## 2024-07-12 ASSESSMENT — LIFESTYLE VARIABLES
HOW OFTEN DO YOU HAVE A DRINK CONTAINING ALCOHOL: NEVER
HOW MANY STANDARD DRINKS CONTAINING ALCOHOL DO YOU HAVE ON A TYPICAL DAY: PATIENT DOES NOT DRINK

## 2024-07-12 NOTE — PROGRESS NOTES
Chief Complaint   Patient presents with    Hypertension     Follow up    Abdominal Pain     Patient reports right-sided abdominal. The pain is a 7/10.       Care A Van:  Feliberto.  Rozina Rivas LPN    Patient name and date of birth verified by .  Patient given an after visit summary.  Advised to schedule next appointments before leaving clinic office.  Patient verbalized understanding of all information given at time of visit. Rozina Rivas LPN      
Chief Complaint   Patient presents with    Hypertension     Follow up    Abdominal Pain     Patient reports right-sided abdominal. The pain is a 7/10.      Vitals:    07/12/24 1350   BP: (!) 128/56   Pulse:    Temp:    SpO2:      \"Have you been to the ER, urgent care clinic since your last visit?  Hospitalized since your last visit?\"    NO    “Have you seen or consulted any other health care providers outside of [facility] since your last visit?”    [facility]    Have you had a mammogram?”   Most recent below    Date of last Mammogram: 9/20/2019         “Have you had a colorectal cancer screening such as a colonoscopy/FIT/Cologuard?    FIT date below, no others    No colonoscopy on file  No cologuard on file  Date of last FIT: 9/20/2019   No flexible sigmoidoscopy on file         Click Here for Release of Records Request    
Oriented to time, place, person & situation.  Appropriate mood and affect.  NECK:  Normal inspection, normal palpation without any lymphadenopathy, masses, or thyromegaly  CARDIOVASCULAR:  Regular rate and rhythm.  Normal S1, S2.  No extra sounds.  RESPIRATORY:  Normal effort.  Normal ascultation without wheezing.  ABDOMEN:  Normal bowel sounds.  Abdomen soft, non tender.  No hepatosplenomegaly or masses.  VASCULAR:  Normal Carotid pulses without bruits.  Normal Posterior Tibialis pulses.  No abdominal or femoral bruits.  EXTREMITIES:  No edema.    No results found for any visits on 07/12/24.       An electronic signature was used to authenticate this note.  -- Noah Gandhi MD

## 2024-07-13 LAB
ALBUMIN SERPL-MCNC: 3.9 G/DL (ref 3.5–5)
ALBUMIN/GLOB SERPL: 1.2 (ref 1.1–2.2)
ALP SERPL-CCNC: 126 U/L (ref 45–117)
ALT SERPL-CCNC: 35 U/L (ref 12–78)
ANION GAP SERPL CALC-SCNC: 7 MMOL/L (ref 5–15)
AST SERPL-CCNC: 24 U/L (ref 15–37)
BILIRUB SERPL-MCNC: 0.4 MG/DL (ref 0.2–1)
BUN SERPL-MCNC: 11 MG/DL (ref 6–20)
BUN/CREAT SERPL: 16 (ref 12–20)
CALCIUM SERPL-MCNC: 9.6 MG/DL (ref 8.5–10.1)
CHLORIDE SERPL-SCNC: 107 MMOL/L (ref 97–108)
CHOLEST SERPL-MCNC: 96 MG/DL
CO2 SERPL-SCNC: 26 MMOL/L (ref 21–32)
CREAT SERPL-MCNC: 0.67 MG/DL (ref 0.55–1.02)
GLOBULIN SER CALC-MCNC: 3.3 G/DL (ref 2–4)
GLUCOSE SERPL-MCNC: 102 MG/DL (ref 65–100)
HDLC SERPL-MCNC: 42 MG/DL
HDLC SERPL: 2.3 (ref 0–5)
LDLC SERPL CALC-MCNC: 38.4 MG/DL (ref 0–100)
POTASSIUM SERPL-SCNC: 3.5 MMOL/L (ref 3.5–5.1)
PROT SERPL-MCNC: 7.2 G/DL (ref 6.4–8.2)
SODIUM SERPL-SCNC: 140 MMOL/L (ref 136–145)
TRIGL SERPL-MCNC: 78 MG/DL
TSH SERPL DL<=0.05 MIU/L-ACNC: 1.68 UIU/ML (ref 0.36–3.74)
VLDLC SERPL CALC-MCNC: 15.6 MG/DL

## 2024-07-29 ENCOUNTER — TELEMEDICINE (OUTPATIENT)
Age: 74
End: 2024-07-29

## 2024-07-29 DIAGNOSIS — E78.00 PURE HYPERCHOLESTEROLEMIA: ICD-10-CM

## 2024-07-29 DIAGNOSIS — E03.9 HYPOTHYROIDISM, UNSPECIFIED TYPE: ICD-10-CM

## 2024-07-29 DIAGNOSIS — I10 HYPERTENSION, UNSPECIFIED TYPE: Primary | ICD-10-CM

## 2024-07-29 DIAGNOSIS — R73.03 PREDIABETES: ICD-10-CM

## 2024-07-29 PROCEDURE — 99441 PR PHYS/QHP TELEPHONE EVALUATION 5-10 MIN: CPT | Performed by: FAMILY MEDICINE

## 2024-07-29 SDOH — ECONOMIC STABILITY: FOOD INSECURITY: WITHIN THE PAST 12 MONTHS, YOU WORRIED THAT YOUR FOOD WOULD RUN OUT BEFORE YOU GOT MONEY TO BUY MORE.: NEVER TRUE

## 2024-07-29 SDOH — ECONOMIC STABILITY: FOOD INSECURITY: WITHIN THE PAST 12 MONTHS, THE FOOD YOU BOUGHT JUST DIDN'T LAST AND YOU DIDN'T HAVE MONEY TO GET MORE.: NEVER TRUE

## 2024-07-29 SDOH — ECONOMIC STABILITY: INCOME INSECURITY: HOW HARD IS IT FOR YOU TO PAY FOR THE VERY BASICS LIKE FOOD, HOUSING, MEDICAL CARE, AND HEATING?: NOT VERY HARD

## 2024-07-29 ASSESSMENT — PATIENT HEALTH QUESTIONNAIRE - PHQ9
1. LITTLE INTEREST OR PLEASURE IN DOING THINGS: NOT AT ALL
SUM OF ALL RESPONSES TO PHQ9 QUESTIONS 1 & 2: 0
SUM OF ALL RESPONSES TO PHQ QUESTIONS 1-9: 0
2. FEELING DOWN, DEPRESSED OR HOPELESS: NOT AT ALL

## 2024-07-29 NOTE — PROGRESS NOTES
Intake call to the pt. I spoke with her dtr and legal guardian. Her name and  was verified.     Coordination of Care  1. Have you been to the ER, urgent care clinic since your last visit?  Hospitalized since your last visit? No    2. Have you seen or consulted any other health care providers outside of the [facility] since your last visit?  Include any pap smears or colon screening. No  Does the patient need refills?No    Learning Assessment Complete? no  Depression Screening complete in the past 12 months? yes

## 2024-07-29 NOTE — PROGRESS NOTES
Siobhan Rider (: 1950) is a 74 y.o. female, Established patient, Virtual Visit for evaluation of the following chief complaint(s):   Discuss Labs (Follow up for labs.)       ASSESSMENT/PLAN:  1. Hypertension, unspecified type  Controlled, continue current medication  2. Hypothyroidism, unspecified type  Euthyroid, continue current dose  3. Pure hypercholesterolemia  Well controlled, continue with current dose.  Mildly elevated ALP will repeat on follow up.  4. Prediabetes  Improved, continue with diet.    She plans on returning to [place] later this fall for a while and will follow up prior to leaving.  Return for follow up as scheduled.    SUBJECTIVE/OBJECTIVE:  VV for follow up and lab review. Visit assisted daughter.  Arrived from [place] 2/15/2024.  Pt with a hx of HTN, HLD, hypothyroidism.   HTN:  Patient is taking 1/2 DiovanHCT regularly  HLD:  Patient is taking Lipitor 40mg regularly  Hypothyroidism:  Pt is taking Synthroid 25 mcg daily.  PreDiabetes:  Prior A1c 6.3    Shx: no smoking, no alcohol  PMHx:  Cholecystectomy (6 yrs ago)  BTL  Hysteroscopy with D&C 2024: benign endometrial polyp    Review of Systems     Patient-Reported Vitals  No data recorded     Physical Exam    On this date 2024 I have spent 9 minutes reviewing previous notes, test results and face to face (virtual) with the patient discussing the diagnosis and importance of compliance with the treatment plan as well as documenting on the day of the visit.  Siobhan Rider, was evaluated through a synchronous (real-time) audio-video encounter. The patient (or guardian if applicable) is aware that this is a billable service, which includes applicable co-pays. This Virtual Visit was conducted with patient's (and/or legal guardian's) consent. Patient identification was verified, and a caregiver was present when appropriate.   The patient was located at Home: [address] [place] [address]

## 2024-09-05 ENCOUNTER — HOSPITAL ENCOUNTER (OUTPATIENT)
Facility: HOSPITAL | Age: 74
Setting detail: SPECIMEN
Discharge: HOME | End: 2024-09-08

## 2024-09-05 LAB
BASOPHILS # BLD: 0.1 K/UL (ref 0–0.1)
BASOPHILS NFR BLD: 1 % (ref 0–1)
DIFFERENTIAL METHOD BLD: ABNORMAL
EOSINOPHIL # BLD: 1.5 K/UL (ref 0–0.4)
EOSINOPHIL NFR BLD: 23 % (ref 0–7)
ERYTHROCYTE [DISTWIDTH] IN BLOOD BY AUTOMATED COUNT: 13.3 % (ref 11.5–14.5)
HCT VFR BLD AUTO: 40.7 % (ref 35–47)
HGB BLD-MCNC: 13.1 G/DL (ref 11.5–16)
IMM GRANULOCYTES # BLD AUTO: 0 K/UL (ref 0–0.04)
IMM GRANULOCYTES NFR BLD AUTO: 0 % (ref 0–0.5)
LYMPHOCYTES # BLD: 1.9 K/UL (ref 0.8–3.5)
LYMPHOCYTES NFR BLD: 29 % (ref 12–49)
MCH RBC QN AUTO: 29.2 PG (ref 26–34)
MCHC RBC AUTO-ENTMCNC: 32.2 G/DL (ref 30–36.5)
MCV RBC AUTO: 90.8 FL (ref 80–99)
MONOCYTES # BLD: 0.5 K/UL (ref 0–1)
MONOCYTES NFR BLD: 7 % (ref 5–13)
NEUTS SEG # BLD: 2.7 K/UL (ref 1.8–8)
NEUTS SEG NFR BLD: 40 % (ref 32–75)
NRBC # BLD: 0 K/UL (ref 0–0.01)
NRBC BLD-RTO: 0 PER 100 WBC
PLATELET # BLD AUTO: 241 K/UL (ref 150–400)
PMV BLD AUTO: 11.5 FL (ref 8.9–12.9)
RBC # BLD AUTO: 4.48 M/UL (ref 3.8–5.2)
RBC MORPH BLD: ABNORMAL
WBC # BLD AUTO: 6.7 K/UL (ref 3.6–11)

## 2024-09-05 PROCEDURE — 80053 COMPREHEN METABOLIC PANEL: CPT

## 2024-09-05 PROCEDURE — 84439 ASSAY OF FREE THYROXINE: CPT

## 2024-09-05 PROCEDURE — 83036 HEMOGLOBIN GLYCOSYLATED A1C: CPT

## 2024-09-05 PROCEDURE — 85025 COMPLETE CBC W/AUTO DIFF WBC: CPT

## 2024-09-05 PROCEDURE — 80061 LIPID PANEL: CPT

## 2024-09-05 PROCEDURE — 84443 ASSAY THYROID STIM HORMONE: CPT

## 2024-09-06 LAB
ALBUMIN SERPL-MCNC: 3.7 G/DL (ref 3.5–5)
ALBUMIN/GLOB SERPL: 1.1 (ref 1.1–2.2)
ALP SERPL-CCNC: 114 U/L (ref 45–117)
ALT SERPL-CCNC: 31 U/L (ref 12–78)
ANION GAP SERPL CALC-SCNC: 4 MMOL/L (ref 2–12)
AST SERPL-CCNC: 28 U/L (ref 15–37)
BILIRUB SERPL-MCNC: 0.3 MG/DL (ref 0.2–1)
BUN SERPL-MCNC: 12 MG/DL (ref 6–20)
BUN/CREAT SERPL: 19 (ref 12–20)
CALCIUM SERPL-MCNC: 9.3 MG/DL (ref 8.5–10.1)
CHLORIDE SERPL-SCNC: 107 MMOL/L (ref 97–108)
CHOLEST SERPL-MCNC: 115 MG/DL
CO2 SERPL-SCNC: 29 MMOL/L (ref 21–32)
CREAT SERPL-MCNC: 0.64 MG/DL (ref 0.55–1.02)
EST. AVERAGE GLUCOSE BLD GHB EST-MCNC: 120 MG/DL
GLOBULIN SER CALC-MCNC: 3.3 G/DL (ref 2–4)
GLUCOSE SERPL-MCNC: 87 MG/DL (ref 65–100)
HBA1C MFR BLD: 5.8 % (ref 4–5.6)
HDLC SERPL-MCNC: 48 MG/DL
HDLC SERPL: 2.4 (ref 0–5)
LDLC SERPL CALC-MCNC: 43.6 MG/DL (ref 0–100)
POTASSIUM SERPL-SCNC: 4.2 MMOL/L (ref 3.5–5.1)
PROT SERPL-MCNC: 7 G/DL (ref 6.4–8.2)
SODIUM SERPL-SCNC: 140 MMOL/L (ref 136–145)
T4 FREE SERPL-MCNC: 1.2 NG/DL (ref 0.8–1.5)
TRIGL SERPL-MCNC: 117 MG/DL
TSH SERPL DL<=0.05 MIU/L-ACNC: 1.09 UIU/ML (ref 0.36–3.74)
VLDLC SERPL CALC-MCNC: 23.4 MG/DL

## 2024-09-17 ENCOUNTER — TELEMEDICINE (OUTPATIENT)
Age: 74
End: 2024-09-17

## 2024-09-17 DIAGNOSIS — R73.03 PREDIABETES: ICD-10-CM

## 2024-09-17 DIAGNOSIS — E03.9 HYPOTHYROIDISM, UNSPECIFIED TYPE: ICD-10-CM

## 2024-09-17 DIAGNOSIS — I10 HYPERTENSION, UNSPECIFIED TYPE: ICD-10-CM

## 2024-09-17 DIAGNOSIS — L40.9 PSORIASIS: Primary | ICD-10-CM

## 2024-09-17 DIAGNOSIS — E78.00 PURE HYPERCHOLESTEROLEMIA: ICD-10-CM

## 2024-09-17 PROCEDURE — 1123F ACP DISCUSS/DSCN MKR DOCD: CPT | Performed by: FAMILY MEDICINE

## 2024-09-17 PROCEDURE — 99214 OFFICE O/P EST MOD 30 MIN: CPT | Performed by: FAMILY MEDICINE

## 2024-09-17 RX ORDER — BETAMETHASONE DIPROPIONATE 0.05 %
OINTMENT (GRAM) TOPICAL
Qty: 45 G | Refills: 1 | Status: SHIPPED | OUTPATIENT
Start: 2024-09-17

## 2024-09-17 ASSESSMENT — PATIENT HEALTH QUESTIONNAIRE - PHQ9
2. FEELING DOWN, DEPRESSED OR HOPELESS: NOT AT ALL
SUM OF ALL RESPONSES TO PHQ9 QUESTIONS 1 & 2: 0
SUM OF ALL RESPONSES TO PHQ QUESTIONS 1-9: 0
1. LITTLE INTEREST OR PLEASURE IN DOING THINGS: NOT AT ALL

## 2025-04-01 ENCOUNTER — OFFICE VISIT (OUTPATIENT)
Age: 75
End: 2025-04-01

## 2025-04-01 DIAGNOSIS — Z71.89 COUNSELING AND COORDINATION OF CARE: Primary | ICD-10-CM

## 2025-04-01 NOTE — PROGRESS NOTES
Patient ca,e as a walk in to [facility] regarding medical bills. [facility] explained to patient they are from last year when she has [facility] FA approval. They referred those accounts to collections. [facility] explained the process with collections. Per patient requested [facility] will send by mail copy of the [facility] FA approval letter to [employer] and [employer]. [facility] explained to patient and family member if they don't accept the FA then they will have to call them for payment plan. Patient and family member verbalized understanding the process.

## 2025-04-08 ENCOUNTER — OFFICE VISIT (OUTPATIENT)
Age: 75
End: 2025-04-08

## 2025-04-08 DIAGNOSIS — Z71.89 COUNSELING AND COORDINATION OF CARE: Primary | ICD-10-CM

## 2025-04-08 NOTE — PROGRESS NOTES
AN financial screening for renewal has been completed. Patient has been instructed to call AN appointment line on or after 4/2//25.

## 2025-04-18 ENCOUNTER — CLINICAL DOCUMENTATION (OUTPATIENT)
Age: 75
End: 2025-04-18

## 2025-04-18 NOTE — PROGRESS NOTES
Current [facility] eligibility  on 25. Financial screening documents have been uploaded to [facility] [employer] portal on 25  for eligibility renewal. Patient currently following with Cardiology and Gynecology with scheduled follow-up appointment on  25 .

## 2025-05-15 ENCOUNTER — TELEPHONE (OUTPATIENT)
Age: 75
End: 2025-05-15

## 2025-05-15 NOTE — TELEPHONE ENCOUNTER
5/15/25 Tried calling pt unable to reach letter sent of appt time and day change from 7/1/25 to 7/18/25 and w/NP sll

## 2025-06-13 ENCOUNTER — HOSPITAL ENCOUNTER (OUTPATIENT)
Facility: HOSPITAL | Age: 75
Setting detail: SPECIMEN
Discharge: HOME | End: 2025-06-16

## 2025-06-13 ENCOUNTER — OFFICE VISIT (OUTPATIENT)
Age: 75
End: 2025-06-13

## 2025-06-13 VITALS
BODY MASS INDEX: 34.05 KG/M2 | WEIGHT: 157.8 LBS | RESPIRATION RATE: 17 BRPM | TEMPERATURE: 98 F | HEIGHT: 57 IN | SYSTOLIC BLOOD PRESSURE: 136 MMHG | OXYGEN SATURATION: 98 % | HEART RATE: 85 BPM | DIASTOLIC BLOOD PRESSURE: 61 MMHG

## 2025-06-13 DIAGNOSIS — E78.00 PURE HYPERCHOLESTEROLEMIA: ICD-10-CM

## 2025-06-13 DIAGNOSIS — M79.10 MYALGIA: ICD-10-CM

## 2025-06-13 DIAGNOSIS — R73.03 PREDIABETES: ICD-10-CM

## 2025-06-13 DIAGNOSIS — R20.0 NUMBNESS AND TINGLING IN BOTH HANDS: ICD-10-CM

## 2025-06-13 DIAGNOSIS — L40.9 PSORIASIS: ICD-10-CM

## 2025-06-13 DIAGNOSIS — I10 PRIMARY HYPERTENSION: Primary | ICD-10-CM

## 2025-06-13 DIAGNOSIS — H25.9 AGE-RELATED CATARACT OF BOTH EYES, UNSPECIFIED AGE-RELATED CATARACT TYPE: ICD-10-CM

## 2025-06-13 DIAGNOSIS — E03.9 HYPOTHYROIDISM, UNSPECIFIED TYPE: ICD-10-CM

## 2025-06-13 DIAGNOSIS — R20.2 NUMBNESS AND TINGLING IN BOTH HANDS: ICD-10-CM

## 2025-06-13 DIAGNOSIS — I10 PRIMARY HYPERTENSION: ICD-10-CM

## 2025-06-13 LAB
ERYTHROCYTE [DISTWIDTH] IN BLOOD BY AUTOMATED COUNT: 13 % (ref 11.5–14.5)
HCT VFR BLD AUTO: 41.1 % (ref 35–47)
HGB BLD-MCNC: 13 G/DL (ref 11.5–16)
MCH RBC QN AUTO: 29.7 PG (ref 26–34)
MCHC RBC AUTO-ENTMCNC: 31.6 G/DL (ref 30–36.5)
MCV RBC AUTO: 94.1 FL (ref 80–99)
NRBC # BLD: 0 K/UL (ref 0–0.01)
NRBC BLD-RTO: 0 PER 100 WBC
PLATELET # BLD AUTO: 238 K/UL (ref 150–400)
PMV BLD AUTO: 11.7 FL (ref 8.9–12.9)
RBC # BLD AUTO: 4.37 M/UL (ref 3.8–5.2)
WBC # BLD AUTO: 6.1 K/UL (ref 3.6–11)

## 2025-06-13 PROCEDURE — 84443 ASSAY THYROID STIM HORMONE: CPT

## 2025-06-13 PROCEDURE — 3078F DIAST BP <80 MM HG: CPT | Performed by: FAMILY MEDICINE

## 2025-06-13 PROCEDURE — 84439 ASSAY OF FREE THYROXINE: CPT

## 2025-06-13 PROCEDURE — 82306 VITAMIN D 25 HYDROXY: CPT

## 2025-06-13 PROCEDURE — 3075F SYST BP GE 130 - 139MM HG: CPT | Performed by: FAMILY MEDICINE

## 2025-06-13 PROCEDURE — 82746 ASSAY OF FOLIC ACID SERUM: CPT

## 2025-06-13 PROCEDURE — 85027 COMPLETE CBC AUTOMATED: CPT

## 2025-06-13 PROCEDURE — 83036 HEMOGLOBIN GLYCOSYLATED A1C: CPT

## 2025-06-13 PROCEDURE — 80053 COMPREHEN METABOLIC PANEL: CPT

## 2025-06-13 PROCEDURE — 1123F ACP DISCUSS/DSCN MKR DOCD: CPT | Performed by: FAMILY MEDICINE

## 2025-06-13 PROCEDURE — 99214 OFFICE O/P EST MOD 30 MIN: CPT | Performed by: FAMILY MEDICINE

## 2025-06-13 PROCEDURE — 82607 VITAMIN B-12: CPT

## 2025-06-13 RX ORDER — LEVOTHYROXINE SODIUM 25 UG/1
25 TABLET ORAL DAILY
Qty: 90 TABLET | Refills: 1 | Status: SHIPPED | OUTPATIENT
Start: 2025-06-13

## 2025-06-13 RX ORDER — VALSARTAN AND HYDROCHLOROTHIAZIDE 320; 12.5 MG/1; MG/1
0.5 TABLET, FILM COATED ORAL DAILY
Qty: 90 TABLET | Refills: 3 | Status: SHIPPED | OUTPATIENT
Start: 2025-06-13

## 2025-06-13 RX ORDER — ATORVASTATIN CALCIUM 40 MG/1
40 TABLET, FILM COATED ORAL DAILY
Qty: 90 TABLET | Refills: 3 | Status: SHIPPED | OUTPATIENT
Start: 2025-06-13

## 2025-06-13 SDOH — ECONOMIC STABILITY: FOOD INSECURITY: WITHIN THE PAST 12 MONTHS, THE FOOD YOU BOUGHT JUST DIDN'T LAST AND YOU DIDN'T HAVE MONEY TO GET MORE.: NEVER TRUE

## 2025-06-13 SDOH — ECONOMIC STABILITY: INCOME INSECURITY: IN THE LAST 12 MONTHS, WAS THERE A TIME WHEN YOU WERE NOT ABLE TO PAY THE MORTGAGE OR RENT ON TIME?: NO

## 2025-06-13 SDOH — ECONOMIC STABILITY: FOOD INSECURITY: WITHIN THE PAST 12 MONTHS, YOU WORRIED THAT YOUR FOOD WOULD RUN OUT BEFORE YOU GOT MONEY TO BUY MORE.: NEVER TRUE

## 2025-06-13 SDOH — HEALTH STABILITY: PHYSICAL HEALTH: ON AVERAGE, HOW MANY DAYS PER WEEK DO YOU ENGAGE IN MODERATE TO STRENUOUS EXERCISE (LIKE A BRISK WALK)?: 0 DAYS

## 2025-06-13 SDOH — HEALTH STABILITY: PHYSICAL HEALTH: ON AVERAGE, HOW MANY MINUTES DO YOU ENGAGE IN EXERCISE AT THIS LEVEL?: 0 MIN

## 2025-06-13 ASSESSMENT — PATIENT HEALTH QUESTIONNAIRE - PHQ9
SUM OF ALL RESPONSES TO PHQ QUESTIONS 1-9: 0
SUM OF ALL RESPONSES TO PHQ QUESTIONS 1-9: 0
1. LITTLE INTEREST OR PLEASURE IN DOING THINGS: NOT AT ALL
SUM OF ALL RESPONSES TO PHQ QUESTIONS 1-9: 0
2. FEELING DOWN, DEPRESSED OR HOPELESS: NOT AT ALL
SUM OF ALL RESPONSES TO PHQ QUESTIONS 1-9: 0

## 2025-06-13 ASSESSMENT — ENCOUNTER SYMPTOMS
COUGH: 0
SHORTNESS OF BREATH: 0
CHEST TIGHTNESS: 0

## 2025-06-13 ASSESSMENT — SOCIAL DETERMINANTS OF HEALTH (SDOH)
WITHIN THE LAST YEAR, HAVE TO BEEN RAPED OR FORCED TO HAVE ANY KIND OF SEXUAL ACTIVITY BY YOUR PARTNER OR EX-PARTNER?: NO
WITHIN THE LAST YEAR, HAVE YOU BEEN AFRAID OF YOUR PARTNER OR EX-PARTNER?: NO
HOW HARD IS IT FOR YOU TO PAY FOR THE VERY BASICS LIKE FOOD, HOUSING, MEDICAL CARE, AND HEATING?: NOT HARD AT ALL
WITHIN THE LAST YEAR, HAVE YOU BEEN KICKED, HIT, SLAPPED, OR OTHERWISE PHYSICALLY HURT BY YOUR PARTNER OR EX-PARTNER?: NO
WITHIN THE LAST YEAR, HAVE YOU BEEN HUMILIATED OR EMOTIONALLY ABUSED IN OTHER WAYS BY YOUR PARTNER OR EX-PARTNER?: NO

## 2025-06-13 NOTE — PROGRESS NOTES
Chief Complaint   Patient presents with    Annual Exam    Eye Problem     Hx of cataracts; patient reports her vision is worsening.      /61 (BP Site: Left Upper Arm, Patient Position: Sitting, BP Cuff Size: Medium Adult)   Pulse 85   Temp 98 °F (36.7 °C) (Temporal)   Resp 17   Ht 1.45 m (4' 9.09\")   Wt 71.6 kg (157 lb 12.8 oz)   SpO2 98%   BMI 34.04 kg/m²     Coordination of Care  1. Have you been to the ER, urgent care clinic since your last visit?  Hospitalized since your last visit? No    2. Have you seen or consulted any other health care providers outside of the [facility] since your last visit?  Include any pap smears or colon screening. No  Does the patient need refills?No    
Siobhan Rider seen at d/c, full name and  verified. After Visit Summary provided and all discharge instructions reviewed w/pt. Instructed pt to schedule a yearly check up appt. Medication list reviewed and education provided. Patient was advised that she will be contacted by a [facility] Coordinator in regards to her Ophthalmology referral. Patient made aware that [facility] has its own financial screening. Pt verbalizes understanding, and denies any questions.     -- Oma Mitchell RN  
SHAHRAM Gandhi MD

## 2025-06-14 LAB
ALBUMIN SERPL-MCNC: 3.6 G/DL (ref 3.5–5)
ALBUMIN/GLOB SERPL: 1 (ref 1.1–2.2)
ALP SERPL-CCNC: 149 U/L (ref 45–117)
ALT SERPL-CCNC: 58 U/L (ref 12–78)
ANION GAP SERPL CALC-SCNC: 6 MMOL/L (ref 2–12)
AST SERPL-CCNC: 35 U/L (ref 15–37)
BILIRUB SERPL-MCNC: 0.2 MG/DL (ref 0.2–1)
BUN SERPL-MCNC: 11 MG/DL (ref 6–20)
BUN/CREAT SERPL: 15 (ref 12–20)
CALCIUM SERPL-MCNC: 9.2 MG/DL (ref 8.5–10.1)
CHLORIDE SERPL-SCNC: 108 MMOL/L (ref 97–108)
CO2 SERPL-SCNC: 28 MMOL/L (ref 21–32)
CREAT SERPL-MCNC: 0.72 MG/DL (ref 0.55–1.02)
EST. AVERAGE GLUCOSE BLD GHB EST-MCNC: 114 MG/DL
FOLATE SERPL-MCNC: 13.1 NG/ML (ref 5–21)
GLOBULIN SER CALC-MCNC: 3.6 G/DL (ref 2–4)
GLUCOSE SERPL-MCNC: 134 MG/DL (ref 65–100)
HBA1C MFR BLD: 5.6 % (ref 4–5.6)
POTASSIUM SERPL-SCNC: 3.6 MMOL/L (ref 3.5–5.1)
PROT SERPL-MCNC: 7.2 G/DL (ref 6.4–8.2)
SODIUM SERPL-SCNC: 142 MMOL/L (ref 136–145)
T4 FREE SERPL-MCNC: 1.2 NG/DL (ref 0.8–1.5)
TSH SERPL DL<=0.05 MIU/L-ACNC: 1.26 UIU/ML (ref 0.36–3.74)
VIT B12 SERPL-MCNC: 494 PG/ML (ref 193–986)

## 2025-06-15 LAB — 25(OH)D3 SERPL-MCNC: 20.6 NG/ML (ref 30–100)

## 2025-06-24 ENCOUNTER — RESULTS FOLLOW-UP (OUTPATIENT)
Age: 75
End: 2025-06-24

## 2025-06-24 NOTE — RESULT ENCOUNTER NOTE
DM is near normal.  Continue with diet.  Thyroid is normal, continue with current dose of thyroid  Vitamin D is a little low and so would recommend starting OTC Vitamin D3 1,000 units daily.  Other labs are stable.

## 2025-07-15 NOTE — PROGRESS NOTES
Patient: Siobhan Rider  : 1950    Primary Cardiologist: Belle Peterson MD. [facility]    Today's Date: 2025        HISTORY OF PRESENT ILLNESS:     History of Present Illness:  Presents today for follow-up. Denies chest pain. Complains of her arm falling asleep. Denies shortness of breath, edema, palpitations. Says BP is ok, unable to give me readings.     Referred for chest pain   Used video .  Daughter helped with history. Prior records reviewed (see below).     Patient and daughter are poor historians.   She was admitted in  at a hospital in [place].  Patient not clear what happened there. Apparently did not have heart attack.  No stent placement per daughter.   Per notes, patient with CP back a few months back.     But patient tells me she is doing well. No More CP.  She feels well and able to do what she wants.  Has been on DAPT.     Hiram Weinstein noted in  - \"was in PreOp 1/15/2024 when she developed chest pain and high blood pressure and so was taken to ED. ECHO and serial EKGs available showed no acute changes. She did have a Troponin 1.09 (elevated) and NL SACHA. She saw Cardiology 2 weeks later and was told it was likely not a heart attack. No cath. Was given Plavix, Isosorbide Mononitrate to take \"for prevention\" x 6 months. Patient denies any chest pain, TREJO. Is able to go up 2-3 flights of stairs without any chest pain or pressure. \"        PAST MEDICAL HISTORY:     Past Medical History:   Diagnosis Date    Dyslipidemia     Hypertension     Liver cyst     Thyroid disease        Past Surgical History:   Procedure Laterality Date    CHOLECYSTECTOMY      HYSTEROSCOPY N/A 2024    HYSTEROSCOPY, DILATATION AND CURETTAGE performed by Iman Stoddard MD at [facility] AMBUL[facility] OR    TUBAL LIGATION               CURRENT MEDICATIONS:    .  Current Outpatient Medications   Medication Sig Dispense Refill    valsartan-hydroCHLOROthiazide (DIOVAN-HCT) 320-12.5 MG

## 2025-07-17 NOTE — CONSULTS
Session ID: [id number]  Session Duration: 3 minutes  Language: English   ID: #[id number]   Name: Kathryn IJ/right right left left IJ/right left left

## 2025-07-18 ENCOUNTER — OFFICE VISIT (OUTPATIENT)
Age: 75
End: 2025-07-18

## 2025-07-18 VITALS
SYSTOLIC BLOOD PRESSURE: 136 MMHG | HEIGHT: 57 IN | BODY MASS INDEX: 34.15 KG/M2 | DIASTOLIC BLOOD PRESSURE: 70 MMHG | OXYGEN SATURATION: 97 % | HEART RATE: 77 BPM

## 2025-07-18 DIAGNOSIS — I10 HYPERTENSION, UNSPECIFIED TYPE: ICD-10-CM

## 2025-07-18 DIAGNOSIS — R07.9 CHEST PAIN, UNSPECIFIED TYPE: Primary | ICD-10-CM

## 2025-07-18 PROCEDURE — 3078F DIAST BP <80 MM HG: CPT

## 2025-07-18 PROCEDURE — 1123F ACP DISCUSS/DSCN MKR DOCD: CPT

## 2025-07-18 PROCEDURE — 93010 ELECTROCARDIOGRAM REPORT: CPT

## 2025-07-18 PROCEDURE — 93005 ELECTROCARDIOGRAM TRACING: CPT

## 2025-07-18 PROCEDURE — 3075F SYST BP GE 130 - 139MM HG: CPT

## 2025-07-18 PROCEDURE — 99214 OFFICE O/P EST MOD 30 MIN: CPT

## 2025-07-18 NOTE — PROGRESS NOTES
Chief Complaint   Patient presents with    Hypertension    Chest Pain     Vitals:    07/18/25 0932   BP: 136/70   BP Site: Left Upper Arm   Patient Position: Sitting   BP Cuff Size: Medium Adult   Pulse: 77   SpO2: 97%   Height: 1.448 m (4' 9\")      /70 (BP Site: Left Upper Arm, Patient Position: Sitting, BP Cuff Size: Medium Adult)   Pulse 77   Ht 1.448 m (4' 9\")   SpO2 97%   BMI 34.15 kg/m²

## 2025-08-12 ENCOUNTER — OFFICE VISIT (OUTPATIENT)
Age: 75
End: 2025-08-12

## 2025-08-12 DIAGNOSIS — Z71.89 COUNSELING AND COORDINATION OF CARE: Primary | ICD-10-CM

## (undated) DEVICE — SET ENDOSCP SEAL HYSTEROSCOPE RIG OUTFLO CHN DISP MYOSURE

## (undated) DEVICE — SOLUTION IRRIG 3000ML 0.9% SOD CHL USP UROMATIC PLAS CONT

## (undated) DEVICE — FLUID MGMT SYS FLUENT KIT 6/PK

## (undated) DEVICE — DEVICE TISS REM DIA3MM L25.25IN ENDOSCP F/ IU POLYPS

## (undated) DEVICE — PERI GYN-SFMC: Brand: MEDLINE INDUSTRIES, INC.

## (undated) DEVICE — GLOVE ORANGE PI 7   MSG9070

## (undated) DEVICE — SOLUTION IRRIG 1000ML STRL H2O USP PLAS POUR BTL